# Patient Record
Sex: MALE | Race: WHITE | NOT HISPANIC OR LATINO | URBAN - METROPOLITAN AREA
[De-identification: names, ages, dates, MRNs, and addresses within clinical notes are randomized per-mention and may not be internally consistent; named-entity substitution may affect disease eponyms.]

---

## 2017-02-02 ENCOUNTER — OUTPATIENT (OUTPATIENT)
Dept: OUTPATIENT SERVICES | Facility: HOSPITAL | Age: 82
LOS: 1 days | End: 2017-02-02

## 2017-10-16 ENCOUNTER — OUTPATIENT (OUTPATIENT)
Dept: OUTPATIENT SERVICES | Facility: HOSPITAL | Age: 82
LOS: 1 days | End: 2017-10-16

## 2018-03-01 ENCOUNTER — OUTPATIENT (OUTPATIENT)
Dept: OUTPATIENT SERVICES | Facility: HOSPITAL | Age: 83
LOS: 1 days | End: 2018-03-01

## 2018-07-30 ENCOUNTER — OUTPATIENT (OUTPATIENT)
Dept: OUTPATIENT SERVICES | Facility: HOSPITAL | Age: 83
LOS: 1 days | End: 2018-07-30

## 2018-11-29 ENCOUNTER — OUTPATIENT (OUTPATIENT)
Dept: OUTPATIENT SERVICES | Facility: HOSPITAL | Age: 83
LOS: 1 days | End: 2018-11-29

## 2019-08-08 ENCOUNTER — OUTPATIENT (OUTPATIENT)
Dept: OUTPATIENT SERVICES | Facility: HOSPITAL | Age: 84
LOS: 1 days | End: 2019-08-08

## 2019-11-09 ENCOUNTER — OUTPATIENT (OUTPATIENT)
Dept: OUTPATIENT SERVICES | Facility: HOSPITAL | Age: 84
LOS: 1 days | End: 2019-11-09

## 2020-06-10 ENCOUNTER — OUTPATIENT (OUTPATIENT)
Dept: OUTPATIENT SERVICES | Facility: HOSPITAL | Age: 85
LOS: 1 days | End: 2020-06-10

## 2020-07-28 ENCOUNTER — OUTPATIENT (OUTPATIENT)
Dept: OUTPATIENT SERVICES | Facility: HOSPITAL | Age: 85
LOS: 1 days | End: 2020-07-28

## 2020-07-30 ENCOUNTER — OUTPATIENT (OUTPATIENT)
Dept: OUTPATIENT SERVICES | Facility: HOSPITAL | Age: 85
LOS: 1 days | End: 2020-07-30

## 2020-11-30 ENCOUNTER — OUTPATIENT (OUTPATIENT)
Dept: OUTPATIENT SERVICES | Facility: HOSPITAL | Age: 85
LOS: 1 days | End: 2020-11-30

## 2021-05-14 ENCOUNTER — OUTPATIENT (OUTPATIENT)
Dept: OUTPATIENT SERVICES | Facility: HOSPITAL | Age: 86
LOS: 1 days | End: 2021-05-14

## 2021-06-14 ENCOUNTER — APPOINTMENT (OUTPATIENT)
Dept: ULTRASOUND IMAGING | Facility: CLINIC | Age: 86
End: 2021-06-14
Payer: MEDICARE

## 2021-06-14 PROCEDURE — 93971 EXTREMITY STUDY: CPT | Mod: RT

## 2021-08-20 ENCOUNTER — INPATIENT (INPATIENT)
Facility: HOSPITAL | Age: 86
LOS: 4 days | Discharge: HOME HEALTH | End: 2021-08-25
Payer: MEDICARE

## 2021-08-20 ENCOUNTER — OUTPATIENT (OUTPATIENT)
Dept: OUTPATIENT SERVICES | Facility: HOSPITAL | Age: 86
LOS: 1 days | End: 2021-08-20

## 2021-08-20 PROCEDURE — 93010 ELECTROCARDIOGRAM REPORT: CPT

## 2021-08-20 PROCEDURE — 71045 X-RAY EXAM CHEST 1 VIEW: CPT | Mod: 26

## 2021-08-20 PROCEDURE — 99284 EMERGENCY DEPT VISIT MOD MDM: CPT

## 2021-08-20 PROCEDURE — 99223 1ST HOSP IP/OBS HIGH 75: CPT

## 2021-08-20 PROCEDURE — 93970 EXTREMITY STUDY: CPT | Mod: 26

## 2021-08-21 ENCOUNTER — OUTPATIENT (OUTPATIENT)
Dept: OUTPATIENT SERVICES | Facility: HOSPITAL | Age: 86
LOS: 1 days | End: 2021-08-21

## 2021-08-21 PROCEDURE — 71045 X-RAY EXAM CHEST 1 VIEW: CPT | Mod: 26

## 2021-08-21 PROCEDURE — 93010 ELECTROCARDIOGRAM REPORT: CPT

## 2021-08-21 PROCEDURE — 93306 TTE W/DOPPLER COMPLETE: CPT | Mod: 26

## 2021-08-21 PROCEDURE — 99233 SBSQ HOSP IP/OBS HIGH 50: CPT

## 2021-08-22 ENCOUNTER — OUTPATIENT (OUTPATIENT)
Dept: OUTPATIENT SERVICES | Facility: HOSPITAL | Age: 86
LOS: 1 days | End: 2021-08-22

## 2021-08-23 PROCEDURE — 99233 SBSQ HOSP IP/OBS HIGH 50: CPT | Mod: 25

## 2021-08-23 PROCEDURE — 93460 R&L HRT ART/VENTRICLE ANGIO: CPT | Mod: 26

## 2021-08-24 ENCOUNTER — OUTPATIENT (OUTPATIENT)
Dept: OUTPATIENT SERVICES | Facility: HOSPITAL | Age: 86
LOS: 1 days | End: 2021-08-24

## 2021-08-24 PROCEDURE — 99233 SBSQ HOSP IP/OBS HIGH 50: CPT

## 2021-08-25 ENCOUNTER — OUTPATIENT (OUTPATIENT)
Dept: OUTPATIENT SERVICES | Facility: HOSPITAL | Age: 86
LOS: 1 days | End: 2021-08-25

## 2021-08-27 ENCOUNTER — APPOINTMENT (OUTPATIENT)
Dept: CARDIOLOGY | Facility: CLINIC | Age: 86
End: 2021-08-27
Payer: MEDICARE

## 2021-08-27 VITALS
BODY MASS INDEX: 32.74 KG/M2 | TEMPERATURE: 97.6 F | SYSTOLIC BLOOD PRESSURE: 94 MMHG | HEART RATE: 100 BPM | OXYGEN SATURATION: 95 % | HEIGHT: 68 IN | DIASTOLIC BLOOD PRESSURE: 48 MMHG | WEIGHT: 216 LBS | RESPIRATION RATE: 13 BRPM

## 2021-08-27 DIAGNOSIS — M10.9 GOUT, UNSPECIFIED: ICD-10-CM

## 2021-08-27 DIAGNOSIS — Z78.9 OTHER SPECIFIED HEALTH STATUS: ICD-10-CM

## 2021-08-27 DIAGNOSIS — H91.90 UNSPECIFIED HEARING LOSS, UNSPECIFIED EAR: ICD-10-CM

## 2021-08-27 PROCEDURE — 99215 OFFICE O/P EST HI 40 MIN: CPT

## 2021-08-27 RX ORDER — METOPROLOL TARTRATE 25 MG/1
25 TABLET, FILM COATED ORAL DAILY
Refills: 0 | Status: DISCONTINUED | COMMUNITY
End: 2021-08-27

## 2021-08-27 NOTE — DISCUSSION/SUMMARY
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 feels well after diuresis inpatient. tolerating dapt in anticipation of pci. \par \par \par PLAN:\par - continue dapt in anticipation of PCI at [facility] on 9/9/21\par - continue current BP regimen and GDMT with coreg 6.25 BID and losartan 25; in future can consider entresto however BP precludes this at this time\par - continue high potency statin atorva 40; will increase 80 after pci\par - will repeat TTE 3 months after revasc for LVEF and potential need for ICD evaluation\par \par \par Follow up after PCI at [facility]. ER precautions given to patient.\par

## 2021-08-27 NOTE — PHYSICAL EXAM
[Well Developed] : well developed [Well Nourished] : well nourished [No Acute Distress] : no acute distress [Normal Conjunctiva] : normal conjunctiva [Normal Venous Pressure] : normal venous pressure [Normal S1, S2] : normal S1, S2 [No Rub] : no rub [Clear Lung Fields] : clear lung fields [Good Air Entry] : good air entry [No Respiratory Distress] : no respiratory distress  [Soft] : abdomen soft [Non Tender] : non-tender [Normal Bowel Sounds] : normal bowel sounds [No Cyanosis] : no cyanosis [No Clubbing] : no clubbing [No Rash] : no rash [No Skin Lesions] : no skin lesions [Moves all extremities] : moves all extremities [No Focal Deficits] : no focal deficits [Normal Speech] : normal speech [Alert and Oriented] : alert and oriented [Normal memory] : normal memory [de-identified] : walker, arthritic gait [de-identified] : 1+ edema

## 2021-08-27 NOTE — HISTORY OF PRESENT ILLNESS
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 tolerating dapt in anticipation of pci. Patient denies chest pain, shortness of breath, orthopnea, PND, LE edema, syncope, near syncope.\par \par rra Access site without significant pain/tenderness, redness, swelling, + mild bruising, full ROM, no bruit upon auscultation, and patent distal pulses.\par \par \par TESTING:\par cath 8/23/21: mid lad 1,1,0 across D2 and mid RCA stenosis. mild elevated r/l pressures preserved index\par labwork: cr 1.2, hgb 12.7, \par Echo 8/2021: Ef 30% inferior rwma. mild LAE, mod AS, mod AR, mod MR, mod TR. PASP 55 prior to diuresis

## 2021-08-31 ENCOUNTER — NON-APPOINTMENT (OUTPATIENT)
Age: 86
End: 2021-08-31

## 2021-09-06 ENCOUNTER — APPOINTMENT (OUTPATIENT)
Dept: DISASTER EMERGENCY | Facility: CLINIC | Age: 86
End: 2021-09-06

## 2021-09-07 ENCOUNTER — APPOINTMENT (OUTPATIENT)
Dept: CARDIOLOGY | Facility: CLINIC | Age: 86
End: 2021-09-07
Payer: MEDICARE

## 2021-09-07 ENCOUNTER — OUTPATIENT (OUTPATIENT)
Dept: OUTPATIENT SERVICES | Facility: HOSPITAL | Age: 86
LOS: 1 days | End: 2021-09-07

## 2021-09-07 VITALS
HEIGHT: 68 IN | HEART RATE: 74 BPM | OXYGEN SATURATION: 98 % | WEIGHT: 220 LBS | BODY MASS INDEX: 33.34 KG/M2 | TEMPERATURE: 96.8 F | SYSTOLIC BLOOD PRESSURE: 108 MMHG | DIASTOLIC BLOOD PRESSURE: 70 MMHG

## 2021-09-07 DIAGNOSIS — R05 COUGH: ICD-10-CM

## 2021-09-07 LAB — SARS-COV-2 N GENE NPH QL NAA+PROBE: NOT DETECTED

## 2021-09-07 PROCEDURE — 99213 OFFICE O/P EST LOW 20 MIN: CPT

## 2021-09-08 NOTE — REASON FOR VISIT
[FreeTextEntry1] : Florian is a very pleasant 89-year-old male seen today with his wife.  He was asked to come into the office by his cardiologist, Dr. Cantu.  Patient was seen by primary care doctor with concern for cough.  He has a history of ischemic cardiomyopathy and is scheduled for multivessel intervention this coming Thursday at [facility].  Concern was for worsening CHF/pneumonia.\par \par Patient states that this cough is chronic.  There is expectoration, no hemoptysis.  There is slight increase over the last several weeks to months.  Occurs mostly at night when lying in bed.  Denies shortness of breath, PND, or orthopnea.  There is chronic and improving edema.  Denies any febrile illness.  Denies any other symptoms.

## 2021-09-08 NOTE — ASSESSMENT
[FreeTextEntry1] : To review, Florian is a very pleasant 89-year-old male that presents for evaluation of cough.\par \par CAD/ischemic cardiomyopathy: Currently asymptomatic.  Patient scheduled to undergo multivessel intervention this coming Thursday.  No change at this time.\par \par Cough: Chronic.  Continue follow-up with PMD.  No change at this time.  No signs or symptoms of heart failure.\par \par Case discussed with patient's cardiologist, Dr. Cantu via telephone.

## 2021-09-08 NOTE — REVIEW OF SYSTEMS
[Cough] : cough [Wheezing] : wheezing [Negative] : Constitutional [Coughing Up Blood] : no hemoptysis

## 2021-09-08 NOTE — PHYSICAL EXAM
[Normal] : normal S1, S2, no murmur, no rub, no gallop [Good Air Entry] : good air entry [No Respiratory Distress] : no respiratory distress  [de-identified] : Inspriatory and expiratory wheezing b/l no rales or rhonchi.

## 2021-09-09 ENCOUNTER — TRANSCRIPTION ENCOUNTER (OUTPATIENT)
Age: 86
End: 2021-09-09

## 2021-09-09 ENCOUNTER — OUTPATIENT (OUTPATIENT)
Dept: OUTPATIENT SERVICES | Facility: HOSPITAL | Age: 86
LOS: 1 days | End: 2021-09-09
Payer: MEDICARE

## 2021-09-10 ENCOUNTER — TRANSCRIPTION ENCOUNTER (OUTPATIENT)
Age: 86
End: 2021-09-10

## 2021-09-10 PROCEDURE — 85025 COMPLETE CBC W/AUTO DIFF WBC: CPT

## 2021-09-10 PROCEDURE — 86900 BLOOD TYPING SEROLOGIC ABO: CPT

## 2021-09-10 PROCEDURE — 86769 SARS-COV-2 COVID-19 ANTIBODY: CPT

## 2021-09-10 PROCEDURE — 36415 COLL VENOUS BLD VENIPUNCTURE: CPT

## 2021-09-10 PROCEDURE — 93458 L HRT ARTERY/VENTRICLE ANGIO: CPT | Mod: XU

## 2021-09-10 PROCEDURE — C1887: CPT

## 2021-09-10 PROCEDURE — 80048 BASIC METABOLIC PNL TOTAL CA: CPT

## 2021-09-10 PROCEDURE — C9600: CPT | Mod: XU

## 2021-09-10 PROCEDURE — C1725: CPT

## 2021-09-10 PROCEDURE — C1874: CPT

## 2021-09-10 PROCEDURE — C1753: CPT

## 2021-09-10 PROCEDURE — C1894: CPT

## 2021-09-10 PROCEDURE — 93005 ELECTROCARDIOGRAM TRACING: CPT

## 2021-09-10 PROCEDURE — 92978 ENDOLUMINL IVUS OCT C 1ST: CPT | Mod: LD

## 2021-09-10 PROCEDURE — 86901 BLOOD TYPING SEROLOGIC RH(D): CPT

## 2021-09-10 PROCEDURE — C1769: CPT

## 2021-09-10 PROCEDURE — 86850 RBC ANTIBODY SCREEN: CPT

## 2021-09-10 PROCEDURE — 80076 HEPATIC FUNCTION PANEL: CPT

## 2021-09-10 PROCEDURE — 99153 MOD SED SAME PHYS/QHP EA: CPT

## 2021-09-10 PROCEDURE — 99152 MOD SED SAME PHYS/QHP 5/>YRS: CPT

## 2021-09-13 DIAGNOSIS — I25.10 ATHEROSCLEROTIC HEART DISEASE OF NATIVE CORONARY ARTERY WITHOUT ANGINA PECTORIS: ICD-10-CM

## 2021-09-14 ENCOUNTER — OUTPATIENT (OUTPATIENT)
Dept: OUTPATIENT SERVICES | Facility: HOSPITAL | Age: 86
LOS: 1 days | End: 2021-09-14

## 2021-09-14 DIAGNOSIS — Z90.49 ACQUIRED ABSENCE OF OTHER SPECIFIED PARTS OF DIGESTIVE TRACT: Chronic | ICD-10-CM

## 2021-09-14 PROBLEM — E78.5 HYPERLIPIDEMIA, UNSPECIFIED: Chronic | Status: ACTIVE | Noted: 2021-09-08

## 2021-09-14 PROBLEM — I25.5 ISCHEMIC CARDIOMYOPATHY: Chronic | Status: ACTIVE | Noted: 2021-09-08

## 2021-09-14 PROBLEM — I50.22 CHRONIC SYSTOLIC (CONGESTIVE) HEART FAILURE: Chronic | Status: ACTIVE | Noted: 2021-09-08

## 2021-09-15 ENCOUNTER — APPOINTMENT (OUTPATIENT)
Dept: CARDIOLOGY | Facility: CLINIC | Age: 86
End: 2021-09-15
Payer: MEDICARE

## 2021-09-15 ENCOUNTER — NON-APPOINTMENT (OUTPATIENT)
Age: 86
End: 2021-09-15

## 2021-09-15 VITALS
OXYGEN SATURATION: 97 % | HEART RATE: 75 BPM | DIASTOLIC BLOOD PRESSURE: 50 MMHG | HEIGHT: 68 IN | WEIGHT: 218 LBS | BODY MASS INDEX: 33.04 KG/M2 | SYSTOLIC BLOOD PRESSURE: 100 MMHG | TEMPERATURE: 98.4 F

## 2021-09-15 PROCEDURE — 93000 ELECTROCARDIOGRAM COMPLETE: CPT

## 2021-09-15 PROCEDURE — 99215 OFFICE O/P EST HI 40 MIN: CPT

## 2021-09-15 NOTE — HISTORY OF PRESENT ILLNESS
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  R CFA manual hemostasis without issue.  He feels great with increased energy.  Confirmed with wife.  Compliant with dual antiplatelet therapy.  On ARB and beta-blocker max dose.\par \par Repeat lab work showed stable creatinine at 1.2 post procedure.  LDL 29.  Hemoglobin stable 12.5.\par \par \par \par TESTING:\par EKG 9/15/2021 left bundle with PVCs.  .\par Cath [place] 9/9/2021: PCI mid LAD.  Residual mid RCA.  EDP 22.\par cath 8/23/21: mid lad 1,1,0 across D2 and mid RCA stenosis. mild elevated r/l pressures preserved index\par labwork: cr 1.2, hgb 12.7, \par Echo 8/2021: Ef 30% inferior rwma. mild LAE, mod AS, mod AR, mod MR, mod TR. PASP 55 prior to diuresis

## 2021-09-15 NOTE — PHYSICAL EXAM
[Well Developed] : well developed [Well Nourished] : well nourished [No Acute Distress] : no acute distress [Normal Conjunctiva] : normal conjunctiva [Normal Venous Pressure] : normal venous pressure [Normal S1, S2] : normal S1, S2 [No Rub] : no rub [Clear Lung Fields] : clear lung fields [Good Air Entry] : good air entry [No Respiratory Distress] : no respiratory distress  [Soft] : abdomen soft [Non Tender] : non-tender [Normal Bowel Sounds] : normal bowel sounds [No Cyanosis] : no cyanosis [No Clubbing] : no clubbing [No Rash] : no rash [No Skin Lesions] : no skin lesions [Moves all extremities] : moves all extremities [No Focal Deficits] : no focal deficits [Normal Speech] : normal speech [Alert and Oriented] : alert and oriented [Normal memory] : normal memory [de-identified] : walker, arthritic gait [de-identified] : 1+ edema

## 2021-09-15 NOTE — REASON FOR VISIT
[Symptom and Test Evaluation] : symptom and test evaluation [Coronary Artery Disease] : coronary artery disease [Cardiac Failure] : cardiac failure [CV Risk Factors and Non-Cardiac Disease] : CV risk factors and non-cardiac disease

## 2021-09-15 NOTE — DISCUSSION/SUMMARY
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  Plan for staged PCI mid RCA for complete revascularization on 10/14/2021 at [place].  After this will order for cardiac rehab and repeat TTE 3 months after.\par - continue current BP regimen and GDMT with coreg 6.25 BID and losartan 25; in future can consider entresto however BP precludes this at this time\par -  increase Lasix to 40 daily. low Na diet.\par - continue high potency statin atorva 40; will increase 80 after staged pci rca\par - will repeat TTE 3 months after revasc for LVEF and potential need for ICD evaluation with EP consultation\par - sent all refills in today\par \par \par Follow up after PCI at [facility]. ER precautions given to patient.\par

## 2021-09-21 ENCOUNTER — NON-APPOINTMENT (OUTPATIENT)
Age: 86
End: 2021-09-21

## 2021-10-11 ENCOUNTER — APPOINTMENT (OUTPATIENT)
Dept: DISASTER EMERGENCY | Facility: CLINIC | Age: 86
End: 2021-10-11

## 2021-10-11 DIAGNOSIS — Z01.818 ENCOUNTER FOR OTHER PREPROCEDURAL EXAMINATION: ICD-10-CM

## 2021-10-12 ENCOUNTER — OUTPATIENT (OUTPATIENT)
Dept: OUTPATIENT SERVICES | Facility: HOSPITAL | Age: 86
LOS: 1 days | End: 2021-10-12

## 2021-10-12 DIAGNOSIS — Z90.49 ACQUIRED ABSENCE OF OTHER SPECIFIED PARTS OF DIGESTIVE TRACT: Chronic | ICD-10-CM

## 2021-10-12 LAB — SARS-COV-2 N GENE NPH QL NAA+PROBE: NOT DETECTED

## 2021-10-14 ENCOUNTER — OUTPATIENT (OUTPATIENT)
Dept: OUTPATIENT SERVICES | Facility: HOSPITAL | Age: 86
LOS: 1 days | End: 2021-10-14
Payer: MEDICARE

## 2021-10-14 ENCOUNTER — TRANSCRIPTION ENCOUNTER (OUTPATIENT)
Age: 86
End: 2021-10-14

## 2021-10-14 DIAGNOSIS — Z90.49 ACQUIRED ABSENCE OF OTHER SPECIFIED PARTS OF DIGESTIVE TRACT: Chronic | ICD-10-CM

## 2021-10-15 ENCOUNTER — TRANSCRIPTION ENCOUNTER (OUTPATIENT)
Age: 86
End: 2021-10-15

## 2021-10-15 PROCEDURE — 83735 ASSAY OF MAGNESIUM: CPT

## 2021-10-15 PROCEDURE — 80048 BASIC METABOLIC PNL TOTAL CA: CPT

## 2021-10-15 PROCEDURE — 85025 COMPLETE CBC W/AUTO DIFF WBC: CPT

## 2021-10-15 PROCEDURE — 80053 COMPREHEN METABOLIC PANEL: CPT

## 2021-10-15 PROCEDURE — C1725: CPT

## 2021-10-15 PROCEDURE — C1894: CPT

## 2021-10-15 PROCEDURE — 93005 ELECTROCARDIOGRAM TRACING: CPT

## 2021-10-15 PROCEDURE — C9600: CPT | Mod: RC

## 2021-10-15 PROCEDURE — 99152 MOD SED SAME PHYS/QHP 5/>YRS: CPT

## 2021-10-15 PROCEDURE — 36415 COLL VENOUS BLD VENIPUNCTURE: CPT

## 2021-10-15 PROCEDURE — 99153 MOD SED SAME PHYS/QHP EA: CPT

## 2021-10-15 PROCEDURE — 85027 COMPLETE CBC AUTOMATED: CPT

## 2021-10-15 PROCEDURE — 93458 L HRT ARTERY/VENTRICLE ANGIO: CPT | Mod: 59

## 2021-10-15 PROCEDURE — 80061 LIPID PANEL: CPT

## 2021-10-15 PROCEDURE — C1887: CPT

## 2021-10-15 PROCEDURE — C1769: CPT

## 2021-10-15 PROCEDURE — C1874: CPT

## 2021-10-15 PROCEDURE — 85576 BLOOD PLATELET AGGREGATION: CPT

## 2021-10-18 ENCOUNTER — NON-APPOINTMENT (OUTPATIENT)
Age: 86
End: 2021-10-18

## 2021-10-18 ENCOUNTER — APPOINTMENT (OUTPATIENT)
Dept: CARDIOLOGY | Facility: CLINIC | Age: 86
End: 2021-10-18
Payer: MEDICARE

## 2021-10-18 VITALS
HEART RATE: 80 BPM | TEMPERATURE: 97.6 F | OXYGEN SATURATION: 95 % | SYSTOLIC BLOOD PRESSURE: 110 MMHG | WEIGHT: 216 LBS | BODY MASS INDEX: 32.74 KG/M2 | HEIGHT: 68 IN | DIASTOLIC BLOOD PRESSURE: 68 MMHG

## 2021-10-18 PROBLEM — I10 ESSENTIAL (PRIMARY) HYPERTENSION: Chronic | Status: ACTIVE | Noted: 2021-09-08

## 2021-10-18 PROCEDURE — 93000 ELECTROCARDIOGRAM COMPLETE: CPT

## 2021-10-18 PROCEDURE — 99215 OFFICE O/P EST HI 40 MIN: CPT

## 2021-10-18 RX ORDER — COLCHICINE 0.6 MG/1
0.6 TABLET ORAL DAILY
Refills: 0 | Status: DISCONTINUED | COMMUNITY
End: 2021-10-18

## 2021-10-18 NOTE — REASON FOR VISIT
[Symptom and Test Evaluation] : symptom and test evaluation [Cardiac Failure] : cardiac failure [CV Risk Factors and Non-Cardiac Disease] : CV risk factors and non-cardiac disease [Coronary Artery Disease] : coronary artery disease

## 2021-10-19 DIAGNOSIS — I25.10 ATHEROSCLEROTIC HEART DISEASE OF NATIVE CORONARY ARTERY WITHOUT ANGINA PECTORIS: ICD-10-CM

## 2021-10-19 NOTE — PHYSICAL EXAM
[Well Developed] : well developed [Well Nourished] : well nourished [No Acute Distress] : no acute distress [Normal Conjunctiva] : normal conjunctiva [Normal Venous Pressure] : normal venous pressure [Normal S1, S2] : normal S1, S2 [No Rub] : no rub [Clear Lung Fields] : clear lung fields [Good Air Entry] : good air entry [No Respiratory Distress] : no respiratory distress  [Soft] : abdomen soft [Non Tender] : non-tender [Normal Bowel Sounds] : normal bowel sounds [No Cyanosis] : no cyanosis [No Clubbing] : no clubbing [No Rash] : no rash [No Skin Lesions] : no skin lesions [Moves all extremities] : moves all extremities [No Focal Deficits] : no focal deficits [Normal Speech] : normal speech [Alert and Oriented] : alert and oriented [Normal memory] : normal memory [de-identified] : walker, arthritic gait [de-identified] : 1+ edema

## 2021-10-19 NOTE — DISCUSSION/SUMMARY
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 Improving functional status. Optimizing GDMT.\par \par \par PLAN:\par - GDMT with coreg 6.25 BID and losartan 25; obtaining BMP this week and if ok will switch to entresto low dose and repeat 1 week after that\par - continue dapt. ordered cardiac rehab and a repeat TTE 2 months\par - Lasix 40 daily. low Na diet.\par - increase atorva to 80\par - will repeat TTE in 2 months for LVEF and potential need for ICD evaluation with EP consultation\par - sent all refills in\par \par \par Follow up in 2 months with lumason TTE. ER precautions given to patient.\par

## 2021-10-20 ENCOUNTER — OUTPATIENT (OUTPATIENT)
Dept: OUTPATIENT SERVICES | Facility: HOSPITAL | Age: 86
LOS: 1 days | End: 2021-10-20

## 2021-10-20 DIAGNOSIS — Z90.49 ACQUIRED ABSENCE OF OTHER SPECIFIED PARTS OF DIGESTIVE TRACT: Chronic | ICD-10-CM

## 2021-10-22 ENCOUNTER — NON-APPOINTMENT (OUTPATIENT)
Age: 86
End: 2021-10-22

## 2021-10-22 RX ORDER — LOSARTAN POTASSIUM 25 MG/1
25 TABLET, FILM COATED ORAL
Qty: 90 | Refills: 3 | Status: DISCONTINUED | COMMUNITY
Start: 1900-01-01 | End: 2021-10-22

## 2021-10-26 ENCOUNTER — NON-APPOINTMENT (OUTPATIENT)
Age: 86
End: 2021-10-26

## 2021-10-28 ENCOUNTER — OUTPATIENT (OUTPATIENT)
Dept: OUTPATIENT SERVICES | Facility: HOSPITAL | Age: 86
LOS: 1 days | End: 2021-10-28

## 2021-10-28 ENCOUNTER — NON-APPOINTMENT (OUTPATIENT)
Age: 86
End: 2021-10-28

## 2021-10-28 DIAGNOSIS — Z90.49 ACQUIRED ABSENCE OF OTHER SPECIFIED PARTS OF DIGESTIVE TRACT: Chronic | ICD-10-CM

## 2021-10-29 ENCOUNTER — NON-APPOINTMENT (OUTPATIENT)
Age: 86
End: 2021-10-29

## 2021-11-06 ENCOUNTER — EMERGENCY (EMERGENCY)
Facility: HOSPITAL | Age: 86
LOS: 1 days | End: 2021-11-06
Admitting: EMERGENCY MEDICINE
Payer: MEDICARE

## 2021-11-06 DIAGNOSIS — Z90.49 ACQUIRED ABSENCE OF OTHER SPECIFIED PARTS OF DIGESTIVE TRACT: Chronic | ICD-10-CM

## 2021-11-06 PROCEDURE — 72125 CT NECK SPINE W/O DYE: CPT | Mod: 26

## 2021-11-06 PROCEDURE — 99284 EMERGENCY DEPT VISIT MOD MDM: CPT | Mod: GC

## 2021-11-06 PROCEDURE — 70486 CT MAXILLOFACIAL W/O DYE: CPT | Mod: 26

## 2021-11-06 PROCEDURE — 70450 CT HEAD/BRAIN W/O DYE: CPT | Mod: 26

## 2021-11-08 ENCOUNTER — OUTPATIENT (OUTPATIENT)
Dept: OUTPATIENT SERVICES | Facility: HOSPITAL | Age: 86
LOS: 1 days | End: 2021-11-08

## 2021-11-08 DIAGNOSIS — Z90.49 ACQUIRED ABSENCE OF OTHER SPECIFIED PARTS OF DIGESTIVE TRACT: Chronic | ICD-10-CM

## 2021-11-10 ENCOUNTER — NON-APPOINTMENT (OUTPATIENT)
Age: 86
End: 2021-11-10

## 2021-11-17 ENCOUNTER — NON-APPOINTMENT (OUTPATIENT)
Age: 86
End: 2021-11-17

## 2021-12-05 ENCOUNTER — NON-APPOINTMENT (OUTPATIENT)
Age: 86
End: 2021-12-05

## 2022-01-03 ENCOUNTER — APPOINTMENT (OUTPATIENT)
Dept: CARDIOLOGY | Facility: CLINIC | Age: 87
End: 2022-01-03
Payer: MEDICARE

## 2022-01-03 ENCOUNTER — NON-APPOINTMENT (OUTPATIENT)
Age: 87
End: 2022-01-03

## 2022-01-03 VITALS
WEIGHT: 232 LBS | BODY MASS INDEX: 34.36 KG/M2 | HEART RATE: 76 BPM | HEIGHT: 69 IN | TEMPERATURE: 97.3 F | OXYGEN SATURATION: 94 % | SYSTOLIC BLOOD PRESSURE: 112 MMHG | DIASTOLIC BLOOD PRESSURE: 64 MMHG

## 2022-01-03 PROCEDURE — 96374 THER/PROPH/DIAG INJ IV PUSH: CPT | Mod: 59

## 2022-01-03 PROCEDURE — 93306 TTE W/DOPPLER COMPLETE: CPT

## 2022-01-03 PROCEDURE — 99215 OFFICE O/P EST HI 40 MIN: CPT

## 2022-01-03 RX ORDER — FUROSEMIDE 40 MG/1
40 TABLET ORAL DAILY
Qty: 90 | Refills: 3 | Status: DISCONTINUED | COMMUNITY
Start: 1900-01-01 | End: 2022-01-03

## 2022-01-03 NOTE — HISTORY OF PRESENT ILLNESS
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  PASP 68.  Severe left atrial enlargement.  Mild MR, mild mild AS, moderate AI.  Normal RV function.\par \par increased weight, edema likely due to sob therapeutic diuretic. was doing great in cardiac rehab but stopping this month because of concern for covid. Denies chest pain/SOB. Improving functional status. \par \par Compliant with dual antiplatelet therapy.  On entresto and beta-blocker max doses.\par \par \par TESTING:\par 1/2022 Lumason TTE: EF 35 to 40%.  PASP 68.  Severe left atrial enlargement.  Mild MR, mild mild AS, moderate AI.  Normal RV function.\par Labs 11/2021 Cr 1.2. \par Cath [facility] 10/2021: PCI mid RCA. edp 24\par Labs 10/2021: Hgb 10.5. Cr 1.38. \par EKG 9/15/2021 left bundle with PVCs.  .\par Cath [place] 9/9/2021: PCI mid LAD.  Residual mid RCA.  EDP 22.\par cath 8/23/21: mid lad 1,1,0 across D2 and mid RCA stenosis. mild elevated r/l pressures preserved index\par labwork: cr 1.2, hgb 12.7, LDL 29. \par Echo 8/2021: Ef 30% inferior rwma. mild LAE, mod AS, mod AR, mod MR, mod TR. PASP 55 prior to diuresis

## 2022-01-03 NOTE — DISCUSSION/SUMMARY
[FreeTextEntry1] : \par 89 yo M with hx of HTN and ICMP anastasiya 30% now 35-40% (PCI of mLAD and staged mid RCA 10/2021), LBBB>150 msec, moderate valve disease Aortic/MR, HLD, severe pulm HTN, CKD III (1.2).\par \par increased weight, edema likely due to sob therapeutic diuretic. was doing great in cardiac rehab but stopping this month because of concern for covid. Denies chest pain/SOB. Improving functional status.  Compliant with dual antiplatelet therapy.  On entresto and beta-blocker max doses.\par \par \par PLAN:\par - switch lasix to bumex 1 mg, labwork next week. fluid overload presently. call in 1 week and reassess.\par - GDMT optimized. biv ICD evaluation with EP consultation\par - continue dapt\par - low Na diet.\par - atorva 80\par - yearly TTE next 12/2022 or clinical status change.\par \par \par Follow up in 1 month same day as EP evaluation. ER precautions given to patient.\par

## 2022-01-03 NOTE — PHYSICAL EXAM
[Well Developed] : well developed [Well Nourished] : well nourished [No Acute Distress] : no acute distress [Normal Conjunctiva] : normal conjunctiva [Normal Venous Pressure] : normal venous pressure [Normal S1, S2] : normal S1, S2 [No Rub] : no rub [Soft] : abdomen soft [Non Tender] : non-tender [Normal Bowel Sounds] : normal bowel sounds [No Cyanosis] : no cyanosis [No Clubbing] : no clubbing [No Rash] : no rash [No Skin Lesions] : no skin lesions [Moves all extremities] : moves all extremities [No Focal Deficits] : no focal deficits [Normal Speech] : normal speech [Alert and Oriented] : alert and oriented [Normal memory] : normal memory [No Respiratory Distress] : no respiratory distress  [de-identified] : Expiratory wheezing diffusely. [de-identified] : walker, arthritic gait [de-identified] : 1-2+ edema

## 2022-01-03 NOTE — REASON FOR VISIT
[Symptom and Test Evaluation] : symptom and test evaluation [Cardiac Failure] : cardiac failure [CV Risk Factors and Non-Cardiac Disease] : CV risk factors and non-cardiac disease [Coronary Artery Disease] : coronary artery disease [Spouse] : spouse

## 2022-02-07 ENCOUNTER — APPOINTMENT (OUTPATIENT)
Dept: CARDIOLOGY | Facility: CLINIC | Age: 87
End: 2022-02-07
Payer: MEDICARE

## 2022-02-07 ENCOUNTER — APPOINTMENT (OUTPATIENT)
Dept: ELECTROPHYSIOLOGY | Facility: CLINIC | Age: 87
End: 2022-02-07
Payer: MEDICARE

## 2022-02-07 ENCOUNTER — NON-APPOINTMENT (OUTPATIENT)
Age: 87
End: 2022-02-07

## 2022-02-07 VITALS
DIASTOLIC BLOOD PRESSURE: 68 MMHG | SYSTOLIC BLOOD PRESSURE: 130 MMHG | HEART RATE: 77 BPM | WEIGHT: 239 LBS | TEMPERATURE: 97.1 F | OXYGEN SATURATION: 95 % | BODY MASS INDEX: 36.22 KG/M2 | HEIGHT: 68 IN | RESPIRATION RATE: 18 BRPM

## 2022-02-07 PROCEDURE — 99204 OFFICE O/P NEW MOD 45 MIN: CPT

## 2022-02-07 PROCEDURE — 93000 ELECTROCARDIOGRAM COMPLETE: CPT

## 2022-02-07 PROCEDURE — 99215 OFFICE O/P EST HI 40 MIN: CPT

## 2022-02-07 RX ORDER — BACITRACIN ZINC AND POLYMYXIN B SULFATE 500; 10000 [USP'U]/G; [USP'U]/G
500-10000 OINTMENT OPHTHALMIC
Qty: 4 | Refills: 0 | Status: COMPLETED | COMMUNITY
Start: 2021-11-10

## 2022-02-07 NOTE — PHYSICAL EXAM
[No Acute Distress] : no acute distress [Normal Conjunctiva] : normal conjunctiva [Normal Venous Pressure] : normal venous pressure [Normal S1, S2] : normal S1, S2 [Good Air Entry] : good air entry [Non Tender] : non-tender [No Edema] : no edema [No Focal Deficits] : no focal deficits

## 2022-02-07 NOTE — HISTORY OF PRESENT ILLNESS
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 he has increased urine output on bumex. frequent and high volume, but weight increasing. willing to restart cardiac rehab in march. \par Compliant with dual antiplatelet therapy.  On entresto and beta-blocker max doses.\par \par 1/3/22 "Lumason TTE with: EF 35 to 40%.  PASP 68.  Severe left atrial enlargement.  Mild MR, mild mild AS, moderate AI.  Normal RV function."\par \par \par TESTING:\par 1/2022 Lumason TTE: EF 35 to 40%.  PASP 68.  Severe left atrial enlargement.  Mild MR, mild mild AS, moderate AI.  Normal RV function.\par Labs 11/2021 Cr 1.2. \par Cath [facility] 10/2021: PCI mid RCA. edp 24\par Labs 10/2021: Hgb 10.5. Cr 1.38. \par EKG 9/15/2021 left bundle with PVCs.  .\par Cath [place] 9/9/2021: PCI mid LAD.  Residual mid RCA.  EDP 22.\par cath 8/23/21: mid lad 1,1,0 across D2 and mid RCA stenosis. mild elevated r/l pressures preserved index\par labwork: cr 1.2, hgb 12.7, LDL 29. \par Echo 8/2021: Ef 30% inferior rwma. mild LAE, mod AS, mod AR, mod MR, mod TR. PASP 55 prior to diuresis

## 2022-02-07 NOTE — DISCUSSION/SUMMARY
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 he has increased urine output on bumex. frequent and high volume, but weight increasing. Compliant with dual antiplatelet therapy.  On entresto and beta-blocker max doses.\par \par \par PLAN:\par - continue bumex 1, pending labwork today. I will call.\par - GDMT optimized. biv ICD evaluation with EP consultation today\par - continue dapt\par - low Na diet.\par - atorva 80\par - restart cardiac rehab in march\par - yearly TTE next 12/2022 or clinical status change.\par \par \par Follow up in 4 months. ER precautions given to patient.\par

## 2022-02-07 NOTE — PHYSICAL EXAM
[Well Developed] : well developed [Well Nourished] : well nourished [No Acute Distress] : no acute distress [Normal Conjunctiva] : normal conjunctiva [Normal Venous Pressure] : normal venous pressure [Normal S1, S2] : normal S1, S2 [No Rub] : no rub [No Respiratory Distress] : no respiratory distress  [Soft] : abdomen soft [Non Tender] : non-tender [Normal Bowel Sounds] : normal bowel sounds [No Cyanosis] : no cyanosis [No Clubbing] : no clubbing [No Rash] : no rash [No Skin Lesions] : no skin lesions [Moves all extremities] : moves all extremities [No Focal Deficits] : no focal deficits [Normal Speech] : normal speech [Alert and Oriented] : alert and oriented [Normal memory] : normal memory [de-identified] : Expiratory wheezing improved to now lower bases [de-identified] : walker, arthritic gait [de-identified] : 2+ edema LE

## 2022-02-10 ENCOUNTER — OUTPATIENT (OUTPATIENT)
Dept: OUTPATIENT SERVICES | Facility: HOSPITAL | Age: 87
LOS: 1 days | End: 2022-02-10

## 2022-02-10 DIAGNOSIS — I44.7 LEFT BUNDLE-BRANCH BLOCK, UNSPECIFIED: ICD-10-CM

## 2022-02-10 DIAGNOSIS — I10 ESSENTIAL (PRIMARY) HYPERTENSION: ICD-10-CM

## 2022-02-10 DIAGNOSIS — Z90.49 ACQUIRED ABSENCE OF OTHER SPECIFIED PARTS OF DIGESTIVE TRACT: Chronic | ICD-10-CM

## 2022-02-10 DIAGNOSIS — I25.10 ATHEROSCLEROTIC HEART DISEASE OF NATIVE CORONARY ARTERY WITHOUT ANGINA PECTORIS: ICD-10-CM

## 2022-02-10 DIAGNOSIS — E66.9 OBESITY, UNSPECIFIED: ICD-10-CM

## 2022-02-10 DIAGNOSIS — I25.5 ISCHEMIC CARDIOMYOPATHY: ICD-10-CM

## 2022-02-10 DIAGNOSIS — C18.9 MALIGNANT NEOPLASM OF COLON, UNSPECIFIED: ICD-10-CM

## 2022-02-26 NOTE — HISTORY OF PRESENT ILLNESS
[FreeTextEntry1] : Patient is a 89-year-old man who was accompanied by his spouse.  He is hard of hearing.  He is here for an evaluation of whether he would benefit from a biventricular device.\par \par He denies chest pain, shortness of breath, dizziness, lightens, syncope or presyncope.  His only complaint is numbness in his fingers bilaterally.  Gets better after he puts gloves on.  This is occurred after his stent procedures.\par \par He has a prior history of hypertension and cardiomyopathy with an EF now 35 to 40%.  He has had prior PCI to the mid LAD and staged RCA October 2021.  The patient also has moderate aortic stenosis, moderate mitral regurgitation, severe pulmonary hypertension and CKD stage III.  He has the left bundle branch block with QRS duration 160 ms.\par \par Echocardiogram from 1/3/2022 showed EF 30%, left atrial diameter 4.1 cm, moderate eccentric mitral regurgitation, moderate aortic stenosis, severely dilated left atrium with left atrial volume index 57 cc/m², moderate diastolic dysfunction.

## 2022-02-26 NOTE — DISCUSSION/SUMMARY
[FreeTextEntry1] : Based on his EF > 35% patient would not be a candidate for an ICD.  He does have a left bundle branch block that  is wide.  He does not have heart failure symptoms and because of his age we will  monitor him - should he develop symptoms or worsening -  we would reconsider at least a biventricular pacing device.\par \par I discussed with the patient as well as his wife who feels he is very strong and does want any procedure.\par \par The patient is on carvedilol 6.25 mg twice daily, Entresto and diuretic.  We will continue on medical therapy.

## 2022-02-26 NOTE — REVIEW OF SYSTEMS
[Weight Gain (___ Lbs)] : [unfilled] ~Ulb weight gain [Blurry Vision] : blurred vision [Hearing Loss] : hearing loss [Cough] : cough [SOB] : no shortness of breath [Sore Throat] : no sore throat [Dyspnea on exertion] : not dyspnea during exertion [Chest Discomfort] : no chest discomfort [Palpitations] : no palpitations [Orthopnea] : no orthopnea [Syncope] : no syncope [Abdominal Pain] : no abdominal pain [Hematuria] : no hematuria [Dizziness] : no dizziness [Confusion] : no confusion was observed [Memory Lapses Or Loss] : no memory lapses or loss [Easy Bleeding] : no tendency for easy bleeding

## 2022-03-18 ENCOUNTER — NON-APPOINTMENT (OUTPATIENT)
Age: 87
End: 2022-03-18

## 2022-03-21 ENCOUNTER — APPOINTMENT (OUTPATIENT)
Dept: CARDIOLOGY | Facility: CLINIC | Age: 87
End: 2022-03-21
Payer: MEDICARE

## 2022-03-21 ENCOUNTER — NON-APPOINTMENT (OUTPATIENT)
Age: 87
End: 2022-03-21

## 2022-03-21 ENCOUNTER — OUTPATIENT (OUTPATIENT)
Dept: OUTPATIENT SERVICES | Facility: HOSPITAL | Age: 87
LOS: 1 days | End: 2022-03-21

## 2022-03-21 VITALS
BODY MASS INDEX: 36.37 KG/M2 | WEIGHT: 240 LBS | TEMPERATURE: 97.1 F | SYSTOLIC BLOOD PRESSURE: 108 MMHG | OXYGEN SATURATION: 97 % | DIASTOLIC BLOOD PRESSURE: 70 MMHG | HEIGHT: 68 IN | HEART RATE: 62 BPM

## 2022-03-21 DIAGNOSIS — I38 ENDOCARDITIS, VALVE UNSPECIFIED: ICD-10-CM

## 2022-03-21 DIAGNOSIS — I44.7 LEFT BUNDLE-BRANCH BLOCK, UNSPECIFIED: ICD-10-CM

## 2022-03-21 DIAGNOSIS — I10 ESSENTIAL (PRIMARY) HYPERTENSION: ICD-10-CM

## 2022-03-21 DIAGNOSIS — I25.10 ATHEROSCLEROTIC HEART DISEASE OF NATIVE CORONARY ARTERY WITHOUT ANGINA PECTORIS: ICD-10-CM

## 2022-03-21 DIAGNOSIS — Z90.49 ACQUIRED ABSENCE OF OTHER SPECIFIED PARTS OF DIGESTIVE TRACT: Chronic | ICD-10-CM

## 2022-03-21 DIAGNOSIS — I25.5 ISCHEMIC CARDIOMYOPATHY: ICD-10-CM

## 2022-03-21 PROCEDURE — 93000 ELECTROCARDIOGRAM COMPLETE: CPT

## 2022-03-21 PROCEDURE — 99215 OFFICE O/P EST HI 40 MIN: CPT

## 2022-03-21 RX ORDER — CLOPIDOGREL 75 MG/1
75 TABLET, FILM COATED ORAL DAILY
Qty: 90 | Refills: 2 | Status: DISCONTINUED | COMMUNITY
End: 2022-03-21

## 2022-03-21 RX ORDER — CARVEDILOL 6.25 MG/1
6.25 TABLET, FILM COATED ORAL TWICE DAILY
Qty: 180 | Refills: 3 | Status: DISCONTINUED | COMMUNITY
Start: 1900-01-01 | End: 2022-03-21

## 2022-03-21 NOTE — PHYSICAL EXAM
[Well Developed] : well developed [Well Nourished] : well nourished [No Acute Distress] : no acute distress [Normal Conjunctiva] : normal conjunctiva [Normal S1, S2] : normal S1, S2 [No Rub] : no rub [No Respiratory Distress] : no respiratory distress  [Soft] : abdomen soft [Non Tender] : non-tender [Normal Bowel Sounds] : normal bowel sounds [Moves all extremities] : moves all extremities [Alert and Oriented] : alert and oriented [Normal memory] : normal memory [Obese] : obese [de-identified] : JVPE 16 [de-identified] : mild expiratory wheezing with faint bibasilar crackles [de-identified] : walker, arthritic gait [de-identified] : 3+ edema LE, brawny skin [de-identified] : fungal

## 2022-03-21 NOTE — DISCUSSION/SUMMARY
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  Difficulty sleeping.  Increasing lower extremity edema and weight gain over the past few months.  Interim lab work showed increasing BUN to creatinine ratio.  I added on lab work today to [facility] which I received and shows that his BUN is now 60 and creatinine 1.5.  He does urinate more with the Bumex but this has dropped off a bit.  Denies angina.  EKG today with unchanged left bundle branch block.\par He also was evaluated in the interim by EP and declines procedures at present.\par Has a severe intertriginous fungal rash.\par \par \par PLAN:\par -Add on lab work today at [facility] system shows BUN is 60 and potassium 5.2.  I called after visit and recommend ER presentation to safely diurese patient with close monitoring of CMP.  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 MILLIE. Concerned for severe pHTN and potentially RV failure in addition to LV failure. \par -Patient/family prefer to take Bumex p.o. tonight and then present in a.m. to [facility].  They understand risks of this.\par -For now, he will not take the Entresto/carvedilol given home blood pressures in 90s with significant fatigue.  Will restart slowly after [facility] presentation.\par -Patient has been on optimal medical therapy and saw EP.  Defer on procedures at present.\par -I stopped Plavix.  Continue aspirin for monotherapy now.  LDL very well controlled on atorvastatin 80.\par -Restart cardiac rehab after improvement post hospitalization.\par - elevate LEs. compression stockings after discharge. \par - nystatin\par \par \par Follow-up after ER presentation.\par

## 2022-03-21 NOTE — REASON FOR VISIT
[Symptom and Test Evaluation] : symptom and test evaluation [Cardiac Failure] : cardiac failure [CV Risk Factors and Non-Cardiac Disease] : CV risk factors and non-cardiac disease [Coronary Artery Disease] : coronary artery disease [Spouse] : spouse [Family Member] : family member

## 2022-03-21 NOTE — HISTORY OF PRESENT ILLNESS
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  Difficulty sleeping.  Increasing lower extremity edema and weight gain over the past few months.  Interim lab work showed increasing BUN to creatinine ratio.  I added on lab work today to [facility] which I received and shows that his BUN is now 60 and creatinine 1.5.  He does urinate more with the Bumex but this has dropped off a bit.  Denies angina.  EKG today with unchanged left bundle branch block.\par He also was evaluated in the interim by EP and declines procedures at present.\par Has a severe intertriginous fungal rash.\par \par \par TESTING:\par 3/2022 LABWORK [facility]: BUN 60. Cr 1.5. LFT mild elevated. LDL 19. normal ferritin. Hgb 11.8. \par 1/2022 [facility] TTE: EF 35 to 40%.  PASP 68.  Severe left atrial enlargement.  Mild MR, mild mild AS, moderate AI.  Normal RV function.\par Labs 11/2021 Cr 1.2. \par Cath [facility] 10/2021: PCI mid RCA. edp 24\par Labs 10/2021: Hgb 10.5. Cr 1.38. \par EKG 9/15/2021 left bundle with PVCs.  .\par Cath [place] 9/9/2021: PCI mid LAD.  Residual mid RCA.  EDP 22.\par cath 8/23/21: mid lad 1,1,0 across D2 and mid RCA stenosis. mild elevated r/l pressures preserved index\par labwork: cr 1.2, hgb 12.7, LDL 29. \par Echo 8/2021: Ef 30% inferior rwma. mild LAE, mod AS, mod AR, mod MR, mod TR. PASP 55 prior to diuresis

## 2022-03-22 ENCOUNTER — INPATIENT (INPATIENT)
Facility: HOSPITAL | Age: 87
LOS: 6 days | Discharge: NURSING, REHAB OR LONG-TERM CARE | End: 2022-03-29
Attending: FAMILY MEDICINE
Payer: MEDICARE

## 2022-03-22 ENCOUNTER — OUTPATIENT (OUTPATIENT)
Dept: OUTPATIENT SERVICES | Facility: HOSPITAL | Age: 87
LOS: 1 days | End: 2022-03-22

## 2022-03-22 DIAGNOSIS — Z90.49 ACQUIRED ABSENCE OF OTHER SPECIFIED PARTS OF DIGESTIVE TRACT: Chronic | ICD-10-CM

## 2022-03-22 PROCEDURE — 99285 EMERGENCY DEPT VISIT HI MDM: CPT

## 2022-03-22 PROCEDURE — 99223 1ST HOSP IP/OBS HIGH 75: CPT

## 2022-03-22 PROCEDURE — 71045 X-RAY EXAM CHEST 1 VIEW: CPT | Mod: 26

## 2022-03-22 PROCEDURE — 93010 ELECTROCARDIOGRAM REPORT: CPT

## 2022-03-22 PROCEDURE — 71250 CT THORAX DX C-: CPT | Mod: 26

## 2022-03-23 ENCOUNTER — OUTPATIENT (OUTPATIENT)
Dept: OUTPATIENT SERVICES | Facility: HOSPITAL | Age: 87
LOS: 1 days | End: 2022-03-23

## 2022-03-23 DIAGNOSIS — Z90.49 ACQUIRED ABSENCE OF OTHER SPECIFIED PARTS OF DIGESTIVE TRACT: Chronic | ICD-10-CM

## 2022-03-23 PROCEDURE — 99233 SBSQ HOSP IP/OBS HIGH 50: CPT

## 2022-03-24 ENCOUNTER — OUTPATIENT (OUTPATIENT)
Dept: OUTPATIENT SERVICES | Facility: HOSPITAL | Age: 87
LOS: 1 days | End: 2022-03-24

## 2022-03-24 DIAGNOSIS — Z90.49 ACQUIRED ABSENCE OF OTHER SPECIFIED PARTS OF DIGESTIVE TRACT: Chronic | ICD-10-CM

## 2022-03-24 PROCEDURE — 99233 SBSQ HOSP IP/OBS HIGH 50: CPT

## 2022-03-25 ENCOUNTER — OUTPATIENT (OUTPATIENT)
Dept: OUTPATIENT SERVICES | Facility: HOSPITAL | Age: 87
LOS: 1 days | End: 2022-03-25

## 2022-03-25 DIAGNOSIS — Z90.49 ACQUIRED ABSENCE OF OTHER SPECIFIED PARTS OF DIGESTIVE TRACT: Chronic | ICD-10-CM

## 2022-03-25 PROCEDURE — 71045 X-RAY EXAM CHEST 1 VIEW: CPT | Mod: 26

## 2022-03-26 ENCOUNTER — OUTPATIENT (OUTPATIENT)
Dept: OUTPATIENT SERVICES | Facility: HOSPITAL | Age: 87
LOS: 1 days | End: 2022-03-26

## 2022-03-26 DIAGNOSIS — Z90.49 ACQUIRED ABSENCE OF OTHER SPECIFIED PARTS OF DIGESTIVE TRACT: Chronic | ICD-10-CM

## 2022-03-27 ENCOUNTER — OUTPATIENT (OUTPATIENT)
Dept: OUTPATIENT SERVICES | Facility: HOSPITAL | Age: 87
LOS: 1 days | End: 2022-03-27

## 2022-03-27 DIAGNOSIS — Z90.49 ACQUIRED ABSENCE OF OTHER SPECIFIED PARTS OF DIGESTIVE TRACT: Chronic | ICD-10-CM

## 2022-03-28 ENCOUNTER — OUTPATIENT (OUTPATIENT)
Dept: OUTPATIENT SERVICES | Facility: HOSPITAL | Age: 87
LOS: 1 days | End: 2022-03-28

## 2022-03-28 DIAGNOSIS — Z90.49 ACQUIRED ABSENCE OF OTHER SPECIFIED PARTS OF DIGESTIVE TRACT: Chronic | ICD-10-CM

## 2022-03-31 DIAGNOSIS — Z20.822 CONTACT WITH AND (SUSPECTED) EXPOSURE TO COVID-19: ICD-10-CM

## 2022-03-31 DIAGNOSIS — I08.3 COMBINED RHEUMATIC DISORDERS OF MITRAL, AORTIC AND TRICUSPID VALVES: ICD-10-CM

## 2022-03-31 DIAGNOSIS — L03.311 CELLULITIS OF ABDOMINAL WALL: ICD-10-CM

## 2022-03-31 DIAGNOSIS — I44.7 LEFT BUNDLE-BRANCH BLOCK, UNSPECIFIED: ICD-10-CM

## 2022-03-31 DIAGNOSIS — I27.20 PULMONARY HYPERTENSION, UNSPECIFIED: ICD-10-CM

## 2022-03-31 DIAGNOSIS — I25.5 ISCHEMIC CARDIOMYOPATHY: ICD-10-CM

## 2022-03-31 DIAGNOSIS — L30.4 ERYTHEMA INTERTRIGO: ICD-10-CM

## 2022-03-31 DIAGNOSIS — I50.23 ACUTE ON CHRONIC SYSTOLIC (CONGESTIVE) HEART FAILURE: ICD-10-CM

## 2022-03-31 DIAGNOSIS — N17.9 ACUTE KIDNEY FAILURE, UNSPECIFIED: ICD-10-CM

## 2022-03-31 DIAGNOSIS — Z95.5 PRESENCE OF CORONARY ANGIOPLASTY IMPLANT AND GRAFT: ICD-10-CM

## 2022-03-31 DIAGNOSIS — E66.9 OBESITY, UNSPECIFIED: ICD-10-CM

## 2022-03-31 DIAGNOSIS — M10.9 GOUT, UNSPECIFIED: ICD-10-CM

## 2022-03-31 DIAGNOSIS — Z66 DO NOT RESUSCITATE: ICD-10-CM

## 2022-03-31 DIAGNOSIS — N18.30 CHRONIC KIDNEY DISEASE, STAGE 3 UNSPECIFIED: ICD-10-CM

## 2022-03-31 DIAGNOSIS — Z51.5 ENCOUNTER FOR PALLIATIVE CARE: ICD-10-CM

## 2022-03-31 DIAGNOSIS — I25.10 ATHEROSCLEROTIC HEART DISEASE OF NATIVE CORONARY ARTERY WITHOUT ANGINA PECTORIS: ICD-10-CM

## 2022-03-31 DIAGNOSIS — I13.0 HYPERTENSIVE HEART AND CHRONIC KIDNEY DISEASE WITH HEART FAILURE AND STAGE 1 THROUGH STAGE 4 CHRONIC KIDNEY DISEASE, OR UNSPECIFIED CHRONIC KIDNEY DISEASE: ICD-10-CM

## 2022-03-31 DIAGNOSIS — R60.9 EDEMA, UNSPECIFIED: ICD-10-CM

## 2022-04-07 ENCOUNTER — OUTPATIENT (OUTPATIENT)
Dept: OUTPATIENT SERVICES | Facility: HOSPITAL | Age: 87
LOS: 1 days | End: 2022-04-07

## 2022-04-07 DIAGNOSIS — D69.6 THROMBOCYTOPENIA, UNSPECIFIED: ICD-10-CM

## 2022-04-07 DIAGNOSIS — N18.9 CHRONIC KIDNEY DISEASE, UNSPECIFIED: ICD-10-CM

## 2022-04-07 DIAGNOSIS — I10 ESSENTIAL (PRIMARY) HYPERTENSION: ICD-10-CM

## 2022-04-07 DIAGNOSIS — I50.9 HEART FAILURE, UNSPECIFIED: ICD-10-CM

## 2022-04-07 DIAGNOSIS — Z90.49 ACQUIRED ABSENCE OF OTHER SPECIFIED PARTS OF DIGESTIVE TRACT: Chronic | ICD-10-CM

## 2022-04-08 ENCOUNTER — APPOINTMENT (OUTPATIENT)
Dept: CARDIOLOGY | Facility: CLINIC | Age: 87
End: 2022-04-08
Payer: MEDICARE

## 2022-04-08 VITALS
DIASTOLIC BLOOD PRESSURE: 50 MMHG | SYSTOLIC BLOOD PRESSURE: 92 MMHG | TEMPERATURE: 96.9 F | WEIGHT: 240 LBS | HEIGHT: 68 IN | OXYGEN SATURATION: 95 % | HEART RATE: 76 BPM | BODY MASS INDEX: 36.37 KG/M2

## 2022-04-08 PROCEDURE — 99215 OFFICE O/P EST HI 40 MIN: CPT

## 2022-04-08 RX ORDER — TIMOLOL MALEATE 5 MG/ML
0.5 SOLUTION OPHTHALMIC
Refills: 0 | Status: DISCONTINUED | COMMUNITY
End: 2022-04-08

## 2022-04-08 RX ORDER — LATANOPROST/PF 0.005 %
0.01 DROPS OPHTHALMIC (EYE)
Refills: 0 | Status: DISCONTINUED | COMMUNITY
End: 2022-04-08

## 2022-04-08 RX ORDER — CYCLOSPORINE 0.5 MG/ML
0.05 EMULSION OPHTHALMIC
Qty: 60 | Refills: 0 | Status: DISCONTINUED | COMMUNITY
Start: 2022-02-09 | End: 2022-04-08

## 2022-04-08 NOTE — HISTORY OF PRESENT ILLNESS
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  I reviewed all records patient was diuresed aggressively with an MILLIE now improved to near baseline 1.5.  Was discharged about a week ago and underwent lab work post hospitalization yesterday.  The results are fairly promising as detailed below.  He feels much improved after discharge.  Compliant with medications.  Wife present.  Improved dyspnea.  Still with lower extremity edema.  Rash/cellulitis in pannus improved\par \par VISIT 3/2022: He reports being more fatigued and short of breath with mild exertion.  Difficulty sleeping.  Increasing lower extremity edema and weight gain over the past few months.  Interim lab work showed increasing BUN to creatinine ratio.  I added on lab work today to [facility] which I received and shows that his BUN is now 60 and creatinine 1.5.  He does urinate more with the Bumex but this has dropped off a bit.  Denies angina.  EKG today with unchanged left bundle branch block.\par He also was evaluated in the interim by EP and declines procedures at present.\par Has a severe intertriginous fungal rash.\par \par \par TESTING:\par 4/2022 LABWORK: Creatinine 1.5.  BUN 41.  Normal LFT.  Hemoglobin 12.3.  Platelets improved to 242.\par \par 3/2022 LABWORK [facility]: BUN 60. Cr 1.5. LFT mild elevated. LDL 19. normal ferritin. Hgb 11.8. \par 1/2022 [facility] TTE: EF 35 to 40%.  PASP 68.  Severe left atrial enlargement.  Mild MR, mild mild AS, moderate AI.  Normal RV function.\par Labs 11/2021 Cr 1.2. \par Cath [facility] 10/2021: PCI mid RCA. edp 24\par Labs 10/2021: Hgb 10.5. Cr 1.38. \par EKG 9/15/2021 left bundle with PVCs.  .\par Cath [place] 9/9/2021: PCI mid LAD.  Residual mid RCA.  EDP 22.\par cath 8/23/21: mid lad 1,1,0 across D2 and mid RCA stenosis. mild elevated r/l pressures preserved index\par labwork: cr 1.2, hgb 12.7, LDL 29. \par Echo 8/2021: Ef 30% inferior rwma. mild LAE, mod AS, mod AR, mod MR, mod TR. PASP 55 prior to diuresis

## 2022-04-08 NOTE — DISCUSSION/SUMMARY
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  Continue Coreg 3.125 twice daily and Entresto 24/26 twice daily\par - EF assessments: ordered complete echocardiogram with [facility] for EF assessment in 6 to 8 weeks with return visit.  Continue monitoring for indication for CRT-D\par -Restart cardiac rehab\par - volume optimization: Continue Bumex 1 daily.  Monitor status in cardiac rehab.  Weight assessments.  Elevate lower extremities and consider compression stockings with avoid hinging\par -Continue aspirin monotherapy and atorva 80 lipids at goal\par \par # Moderate mixed valve disease as detailed: stable for now\par - serial echo ordered, rest as detailed above\par \par # CKD closely monitor: i think baseline will be 1.4-1.5.\par \par \par Follow-up in 6 weeks with same-day [facility] echocardiogram and lab work.  ER precautions given to patient.\par \par

## 2022-04-08 NOTE — PHYSICAL EXAM
[Well Developed] : well developed [Well Nourished] : well nourished [No Acute Distress] : no acute distress [Obese] : obese [Normal Conjunctiva] : normal conjunctiva [Normal S1, S2] : normal S1, S2 [No Rub] : no rub [No Respiratory Distress] : no respiratory distress  [Soft] : abdomen soft [Non Tender] : non-tender [Normal Bowel Sounds] : normal bowel sounds [Moves all extremities] : moves all extremities [Alert and Oriented] : alert and oriented [Normal memory] : normal memory [de-identified] : JVPE improved to 12 [de-identified] : No further crackles [de-identified] : walker, arthritic gait [de-identified] : 2+ edema LE, brawny skin [de-identified] : fungal rash improved

## 2022-04-11 DIAGNOSIS — I50.21 ACUTE SYSTOLIC (CONGESTIVE) HEART FAILURE: ICD-10-CM

## 2022-04-11 DIAGNOSIS — I25.10 ATHEROSCLEROTIC HEART DISEASE OF NATIVE CORONARY ARTERY WITHOUT ANGINA PECTORIS: ICD-10-CM

## 2022-04-11 DIAGNOSIS — L03.90 CELLULITIS, UNSPECIFIED: ICD-10-CM

## 2022-04-12 DIAGNOSIS — I50.21 ACUTE SYSTOLIC (CONGESTIVE) HEART FAILURE: ICD-10-CM

## 2022-04-12 DIAGNOSIS — I25.10 ATHEROSCLEROTIC HEART DISEASE OF NATIVE CORONARY ARTERY WITHOUT ANGINA PECTORIS: ICD-10-CM

## 2022-04-12 DIAGNOSIS — L03.90 CELLULITIS, UNSPECIFIED: ICD-10-CM

## 2022-04-13 DIAGNOSIS — I25.10 ATHEROSCLEROTIC HEART DISEASE OF NATIVE CORONARY ARTERY WITHOUT ANGINA PECTORIS: ICD-10-CM

## 2022-04-13 DIAGNOSIS — I50.21 ACUTE SYSTOLIC (CONGESTIVE) HEART FAILURE: ICD-10-CM

## 2022-04-13 DIAGNOSIS — L03.90 CELLULITIS, UNSPECIFIED: ICD-10-CM

## 2022-04-14 DIAGNOSIS — L03.90 CELLULITIS, UNSPECIFIED: ICD-10-CM

## 2022-04-14 DIAGNOSIS — I50.21 ACUTE SYSTOLIC (CONGESTIVE) HEART FAILURE: ICD-10-CM

## 2022-04-14 DIAGNOSIS — I25.10 ATHEROSCLEROTIC HEART DISEASE OF NATIVE CORONARY ARTERY WITHOUT ANGINA PECTORIS: ICD-10-CM

## 2022-04-18 DIAGNOSIS — I50.21 ACUTE SYSTOLIC (CONGESTIVE) HEART FAILURE: ICD-10-CM

## 2022-04-18 DIAGNOSIS — I25.10 ATHEROSCLEROTIC HEART DISEASE OF NATIVE CORONARY ARTERY WITHOUT ANGINA PECTORIS: ICD-10-CM

## 2022-04-18 DIAGNOSIS — L03.90 CELLULITIS, UNSPECIFIED: ICD-10-CM

## 2022-04-26 ENCOUNTER — OUTPATIENT (OUTPATIENT)
Dept: OUTPATIENT SERVICES | Facility: HOSPITAL | Age: 87
LOS: 1 days | End: 2022-04-26

## 2022-04-26 DIAGNOSIS — Z90.49 ACQUIRED ABSENCE OF OTHER SPECIFIED PARTS OF DIGESTIVE TRACT: Chronic | ICD-10-CM

## 2022-04-26 DIAGNOSIS — I25.10 ATHEROSCLEROTIC HEART DISEASE OF NATIVE CORONARY ARTERY WITHOUT ANGINA PECTORIS: ICD-10-CM

## 2022-06-02 ENCOUNTER — OUTPATIENT (OUTPATIENT)
Dept: OUTPATIENT SERVICES | Facility: HOSPITAL | Age: 87
LOS: 1 days | End: 2022-06-02

## 2022-06-02 DIAGNOSIS — I25.5 ISCHEMIC CARDIOMYOPATHY: ICD-10-CM

## 2022-06-02 DIAGNOSIS — I10 ESSENTIAL (PRIMARY) HYPERTENSION: ICD-10-CM

## 2022-06-02 DIAGNOSIS — I50.20 UNSPECIFIED SYSTOLIC (CONGESTIVE) HEART FAILURE: ICD-10-CM

## 2022-06-02 DIAGNOSIS — I44.7 LEFT BUNDLE-BRANCH BLOCK, UNSPECIFIED: ICD-10-CM

## 2022-06-02 DIAGNOSIS — I25.10 ATHEROSCLEROTIC HEART DISEASE OF NATIVE CORONARY ARTERY WITHOUT ANGINA PECTORIS: ICD-10-CM

## 2022-06-02 DIAGNOSIS — Z90.49 ACQUIRED ABSENCE OF OTHER SPECIFIED PARTS OF DIGESTIVE TRACT: Chronic | ICD-10-CM

## 2022-06-06 ENCOUNTER — APPOINTMENT (OUTPATIENT)
Dept: CARDIOLOGY | Facility: CLINIC | Age: 87
End: 2022-06-06
Payer: MEDICARE

## 2022-06-06 VITALS
OXYGEN SATURATION: 96 % | HEART RATE: 63 BPM | BODY MASS INDEX: 34.86 KG/M2 | HEIGHT: 68 IN | TEMPERATURE: 97.1 F | WEIGHT: 230 LBS | SYSTOLIC BLOOD PRESSURE: 114 MMHG | DIASTOLIC BLOOD PRESSURE: 76 MMHG

## 2022-06-06 DIAGNOSIS — I38 ENDOCARDITIS, VALVE UNSPECIFIED: ICD-10-CM

## 2022-06-06 PROCEDURE — 96374 THER/PROPH/DIAG INJ IV PUSH: CPT | Mod: 59

## 2022-06-06 PROCEDURE — 93306 TTE W/DOPPLER COMPLETE: CPT

## 2022-06-06 PROCEDURE — 99214 OFFICE O/P EST MOD 30 MIN: CPT | Mod: 25

## 2022-06-09 NOTE — PHYSICAL EXAM
[Well Developed] : well developed [Well Nourished] : well nourished [No Acute Distress] : no acute distress [Obese] : obese [Normal Conjunctiva] : normal conjunctiva [Normal S1, S2] : normal S1, S2 [No Rub] : no rub [No Respiratory Distress] : no respiratory distress  [Soft] : abdomen soft [Non Tender] : non-tender [Normal Bowel Sounds] : normal bowel sounds [Moves all extremities] : moves all extremities [Alert and Oriented] : alert and oriented [Normal memory] : normal memory [de-identified] : JVPE improved to 12 [de-identified] : No further crackles [de-identified] : walker, arthritic gait [de-identified] : 1+ edema LE, brawny skin [de-identified] : fungal rash improved

## 2022-06-09 NOTE — HISTORY OF PRESENT ILLNESS
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 wife and family very happy. no angina, improved tremendously. labs reviewed. \par \par VISIT 4/2022: Follow-up after hospitalization for acutely decompensated systolic heart failure.  I reviewed all records patient was diuresed aggressively with an MILLIE now improved to near baseline 1.5.  Was discharged about a week ago and underwent lab work post hospitalization yesterday.  The results are fairly promising as detailed below.  He feels much improved after discharge.  Compliant with medications.  Wife present.  Improved dyspnea.  Still with lower extremity edema.  Rash/cellulitis in pannus improved\par \par VISIT 3/2022: He reports being more fatigued and short of breath with mild exertion.  Difficulty sleeping.  Increasing lower extremity edema and weight gain over the past few months.  Interim lab work showed increasing BUN to creatinine ratio.  I added on lab work today to [facility] which I received and shows that his BUN is now 60 and creatinine 1.5.  He does urinate more with the Bumex but this has dropped off a bit.  Denies angina.  EKG today with unchanged left bundle branch block.\par He also was evaluated in the interim by EP and declines procedures at present.\par Has a severe intertriginous fungal rash.\par \par \par TESTING:\par 6/2022 LABWORK: hgb 12.7. cr 1.4. k 5.2. plt 142. \par TTE: EF 30%. pasp 60. severe LAE. mod AI/mod AS. mod MR. \par \par 4/2022 LABWORK: Creatinine 1.5.  BUN 41.  Normal LFT.  Hemoglobin 12.3.  Platelets improved to 242.\par \par 3/2022 LABWORK [facility]: BUN 60. Cr 1.5. LFT mild elevated. LDL 19. normal ferritin. Hgb 11.8. \par 1/2022 [facility] TTE: EF 35 to 40%.  PASP 68.  Severe left atrial enlargement.  Mild MR, mild mild AS, moderate AI.  Normal RV function.\par Labs 11/2021 Cr 1.2. \par Cath [facility] 10/2021: PCI mid RCA. edp 24\par Labs 10/2021: Hgb 10.5. Cr 1.38. \par EKG 9/15/2021 left bundle with PVCs.  .\par Cath [place] 9/9/2021: PCI mid LAD.  Residual mid RCA.  EDP 22.\par cath 8/23/21: mid lad 1,1,0 across D2 and mid RCA stenosis. mild elevated r/l pressures preserved index\par labwork: cr 1.2, hgb 12.7, LDL 29. \par Echo 8/2021: Ef 30% inferior rwma. mild LAE, mod AS, mod AR, mod MR, mod TR. PASP 55 prior to diuresis

## 2022-06-09 NOTE — DISCUSSION/SUMMARY
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  Continue Coreg 3.125 twice daily and Entresto 24/26 twice daily\par - discussed again with patient/family regarding CRT-P or D however they are happy with his progress (although TTE worsening), saw EP, no plan for procedures.\par - continue cardiac rehab\par - volume optimization: Continue Bumex 1 daily.  Monitor status in cardiac rehab.  Weight assessments.  Elevate lower extremities and consider compression stockings with avoid hinging\par -Continue aspirin monotherapy and atorva 80 lipids at goal\par \par # Moderate mixed valve disease as detailed: stable for now\par - serial echo q6-12 months, rest as detailed above\par \par # CKD closely monitor: i think baseline will be 1.4-1.5.\par \par \par Follow-up in 3 months with labs. \par \par

## 2022-06-20 ENCOUNTER — APPOINTMENT (OUTPATIENT)
Dept: CARDIOLOGY | Facility: CLINIC | Age: 87
End: 2022-06-20

## 2022-06-22 ENCOUNTER — RX RENEWAL (OUTPATIENT)
Age: 87
End: 2022-06-22

## 2022-08-16 ENCOUNTER — NON-APPOINTMENT (OUTPATIENT)
Age: 87
End: 2022-08-16

## 2022-09-01 ENCOUNTER — RX RENEWAL (OUTPATIENT)
Age: 87
End: 2022-09-01

## 2022-09-28 ENCOUNTER — APPOINTMENT (OUTPATIENT)
Dept: CARDIOLOGY | Facility: CLINIC | Age: 87
End: 2022-09-28

## 2022-09-28 VITALS
HEIGHT: 68 IN | WEIGHT: 240 LBS | DIASTOLIC BLOOD PRESSURE: 66 MMHG | SYSTOLIC BLOOD PRESSURE: 118 MMHG | OXYGEN SATURATION: 96 % | BODY MASS INDEX: 36.37 KG/M2 | HEART RATE: 66 BPM | TEMPERATURE: 96.8 F

## 2022-09-28 PROCEDURE — 99215 OFFICE O/P EST HI 40 MIN: CPT

## 2022-09-28 RX ORDER — POLYVINYL ALCOHOL 14 MG/ML
1.4 SOLUTION/ DROPS OPHTHALMIC
Refills: 0 | Status: DISCONTINUED | COMMUNITY
End: 2022-09-28

## 2022-09-28 RX ORDER — ASPIRIN ENTERIC COATED TABLETS 81 MG 81 MG/1
81 TABLET, DELAYED RELEASE ORAL DAILY
Qty: 90 | Refills: 3 | Status: ACTIVE | COMMUNITY

## 2022-09-28 NOTE — PHYSICAL EXAM
[Well Developed] : well developed [Well Nourished] : well nourished [No Acute Distress] : no acute distress [Obese] : obese [Normal Conjunctiva] : normal conjunctiva [Normal S1, S2] : normal S1, S2 [No Rub] : no rub [No Respiratory Distress] : no respiratory distress  [Soft] : abdomen soft [Non Tender] : non-tender [Normal Bowel Sounds] : normal bowel sounds [Moves all extremities] : moves all extremities [Alert and Oriented] : alert and oriented [Normal memory] : normal memory [de-identified] : JVPE improved to 10 [de-identified] : No further crackles [de-identified] : walker, arthritic gait [de-identified] : trace edema LE, brawny skin [de-identified] : fungal rash improved

## 2022-09-28 NOTE — DISCUSSION/SUMMARY
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 on optimal goal-directed medical therapy with SBP 90s.  Continue Coreg 3.125 twice daily and Entresto 24/26 twice daily. \par - discussed again with patient/family regarding CRT-P or D however they are happy with his progress (although TTE worsening), saw EP, no plan for procedures.\par - encouraged completing cardiac rehab\par - volume optimization: Continue Bumex 1 daily.  Monitor status in cardiac rehab.  Weight assessments.  Elevate lower extremities and consider compression stockings with avoid hinging\par -Continue aspirin monotherapy and atorva 80 lipids at goal\par \par # Moderate mixed valve disease as detailed: stable for now\par - serial echo q6-12 months, rest as detailed above\par \par # CKD closely monitor: i think baseline will be 1.3-1.5.\par \par labs 4 weeks after jardiance. Follow-up in 6 months with labs and echo.  ER precautions given to patient.\par \par

## 2022-09-28 NOTE — HISTORY OF PRESENT ILLNESS
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 weight increase. labwork stable. feels well. happy. does not do cardiac rehab because "does not do anything for him." he is remaining active and going up/down well. 6 sessions left. 40 minutes [employer]. \par \par VISIT 6/2022: feels great, stronger, in cardiac rehab. wife and family very happy. no angina, improved tremendously. labs reviewed. \par \par VISIT 4/2022: Follow-up after hospitalization for acutely decompensated systolic heart failure.  I reviewed all records patient was diuresed aggressively with an MILLIE now improved to near baseline 1.5.  Was discharged about a week ago and underwent lab work post hospitalization yesterday.  The results are fairly promising as detailed below.  He feels much improved after discharge.  Compliant with medications.  Wife present.  Improved dyspnea.  Still with lower extremity edema.  Rash/cellulitis in pannus improved\par \par VISIT 3/2022: He reports being more fatigued and short of breath with mild exertion.  Difficulty sleeping.  Increasing lower extremity edema and weight gain over the past few months.  Interim lab work showed increasing BUN to creatinine ratio.  I added on lab work today to [facility] which I received and shows that his BUN is now 60 and creatinine 1.5.  He does urinate more with the Bumex but this has dropped off a bit.  Denies angina.  EKG today with unchanged left bundle branch block.\par He also was evaluated in the interim by EP and declines procedures at present.\par Has a severe intertriginous fungal rash.\par \par \par TESTING:\par 9/2022 LABWORK: BUN 38.  Creatinine 1.3.  Hemoglobin 12.6.  Potassium 4.7.  Lipid optimized\par \par 6/2022 LABWORK: hgb 12.7. cr 1.4. k 5.2. plt 142. \par TTE: EF 30%. pasp 60. severe LAE. mod AI/mod AS. mod MR. \par \par 4/2022 LABWORK: Creatinine 1.5.  BUN 41.  Normal LFT.  Hemoglobin 12.3.  Platelets improved to 242.\par \par 3/2022 LABWORK [facility]: BUN 60. Cr 1.5. LFT mild elevated. LDL 19. normal ferritin. Hgb 11.8. \par 1/2022 [facility] TTE: EF 35 to 40%.  PASP 68.  Severe left atrial enlargement.  Mild MR, mild mild AS, moderate AI.  Normal RV function.\par Labs 11/2021 Cr 1.2. \par Cath [facility] 10/2021: PCI mid RCA. edp 24\par Labs 10/2021: Hgb 10.5. Cr 1.38. \par EKG 9/15/2021 left bundle with PVCs.  .\par Cath [place] 9/9/2021: PCI mid LAD.  Residual mid RCA.  EDP 22.\par cath 8/23/21: mid lad 1,1,0 across D2 and mid RCA stenosis. mild elevated r/l pressures preserved index\par labwork: cr 1.2, hgb 12.7, LDL 29. \par Echo 8/2021: Ef 30% inferior rwma. mild LAE, mod AS, mod AR, mod MR, mod TR. PASP 55 prior to diuresis

## 2022-10-05 ENCOUNTER — TRANSCRIPTION ENCOUNTER (OUTPATIENT)
Age: 87
End: 2022-10-05

## 2022-10-06 ENCOUNTER — OUTPATIENT (OUTPATIENT)
Dept: OUTPATIENT SERVICES | Facility: HOSPITAL | Age: 87
LOS: 1 days | End: 2022-10-06

## 2022-10-06 ENCOUNTER — APPOINTMENT (OUTPATIENT)
Dept: DISASTER EMERGENCY | Facility: HOSPITAL | Age: 87
End: 2022-10-06

## 2022-10-06 DIAGNOSIS — U07.1 COVID-19: ICD-10-CM

## 2022-10-06 DIAGNOSIS — Z90.49 ACQUIRED ABSENCE OF OTHER SPECIFIED PARTS OF DIGESTIVE TRACT: Chronic | ICD-10-CM

## 2022-10-09 ENCOUNTER — RX CHANGE (OUTPATIENT)
Age: 87
End: 2022-10-09

## 2022-10-10 ENCOUNTER — NON-APPOINTMENT (OUTPATIENT)
Age: 87
End: 2022-10-10

## 2022-10-11 RX ORDER — DAPAGLIFLOZIN 10 MG/1
10 TABLET, FILM COATED ORAL DAILY
Qty: 90 | Refills: 1 | Status: DISCONTINUED | COMMUNITY
Start: 2022-09-28 | End: 2022-10-11

## 2022-10-12 ENCOUNTER — NON-APPOINTMENT (OUTPATIENT)
Age: 87
End: 2022-10-12

## 2023-01-01 ENCOUNTER — NON-APPOINTMENT (OUTPATIENT)
Age: 88
End: 2023-01-01

## 2023-01-01 ENCOUNTER — RX RENEWAL (OUTPATIENT)
Age: 88
End: 2023-01-01

## 2023-01-01 ENCOUNTER — LABORATORY RESULT (OUTPATIENT)
Age: 88
End: 2023-01-01

## 2023-01-01 ENCOUNTER — APPOINTMENT (OUTPATIENT)
Dept: CARDIOLOGY | Facility: CLINIC | Age: 88
End: 2023-01-01
Payer: MEDICARE

## 2023-01-01 ENCOUNTER — RX CHANGE (OUTPATIENT)
Age: 88
End: 2023-01-01

## 2023-01-01 ENCOUNTER — APPOINTMENT (OUTPATIENT)
Dept: CT IMAGING | Facility: CLINIC | Age: 88
End: 2023-01-01
Payer: MEDICARE

## 2023-01-01 VITALS
SYSTOLIC BLOOD PRESSURE: 102 MMHG | OXYGEN SATURATION: 96 % | BODY MASS INDEX: 36.37 KG/M2 | WEIGHT: 240 LBS | HEART RATE: 73 BPM | DIASTOLIC BLOOD PRESSURE: 60 MMHG | HEIGHT: 68 IN

## 2023-01-01 VITALS
BODY MASS INDEX: 37.89 KG/M2 | SYSTOLIC BLOOD PRESSURE: 110 MMHG | HEART RATE: 68 BPM | WEIGHT: 250 LBS | HEIGHT: 68 IN | OXYGEN SATURATION: 97 % | DIASTOLIC BLOOD PRESSURE: 76 MMHG

## 2023-01-01 VITALS
DIASTOLIC BLOOD PRESSURE: 66 MMHG | WEIGHT: 245 LBS | SYSTOLIC BLOOD PRESSURE: 106 MMHG | HEART RATE: 73 BPM | HEIGHT: 68 IN | BODY MASS INDEX: 37.13 KG/M2 | OXYGEN SATURATION: 95 %

## 2023-01-01 VITALS
BODY MASS INDEX: 37.13 KG/M2 | HEIGHT: 68 IN | DIASTOLIC BLOOD PRESSURE: 62 MMHG | HEART RATE: 75 BPM | SYSTOLIC BLOOD PRESSURE: 98 MMHG | OXYGEN SATURATION: 94 % | WEIGHT: 245 LBS

## 2023-01-01 VITALS
SYSTOLIC BLOOD PRESSURE: 106 MMHG | OXYGEN SATURATION: 95 % | DIASTOLIC BLOOD PRESSURE: 48 MMHG | BODY MASS INDEX: 38.01 KG/M2 | HEART RATE: 65 BPM | WEIGHT: 250 LBS

## 2023-01-01 VITALS
OXYGEN SATURATION: 96 % | BODY MASS INDEX: 37.44 KG/M2 | WEIGHT: 247 LBS | HEART RATE: 62 BPM | DIASTOLIC BLOOD PRESSURE: 64 MMHG | HEIGHT: 68 IN | SYSTOLIC BLOOD PRESSURE: 98 MMHG

## 2023-01-01 VITALS
WEIGHT: 240 LBS | DIASTOLIC BLOOD PRESSURE: 58 MMHG | BODY MASS INDEX: 36.37 KG/M2 | OXYGEN SATURATION: 95 % | SYSTOLIC BLOOD PRESSURE: 100 MMHG | HEART RATE: 76 BPM | HEIGHT: 68 IN

## 2023-01-01 DIAGNOSIS — C18.9 MALIGNANT NEOPLASM OF COLON, UNSPECIFIED: ICD-10-CM

## 2023-01-01 DIAGNOSIS — D69.6 THROMBOCYTOPENIA, UNSPECIFIED: ICD-10-CM

## 2023-01-01 DIAGNOSIS — E66.9 OBESITY, UNSPECIFIED: ICD-10-CM

## 2023-01-01 PROCEDURE — 99215 OFFICE O/P EST HI 40 MIN: CPT | Mod: 25

## 2023-01-01 PROCEDURE — 99215 OFFICE O/P EST HI 40 MIN: CPT

## 2023-01-01 PROCEDURE — 93306 TTE W/DOPPLER COMPLETE: CPT

## 2023-01-01 PROCEDURE — 93000 ELECTROCARDIOGRAM COMPLETE: CPT

## 2023-01-01 PROCEDURE — 99214 OFFICE O/P EST MOD 30 MIN: CPT

## 2023-01-01 PROCEDURE — 71250 CT THORAX DX C-: CPT | Mod: MH

## 2023-01-01 RX ORDER — CARVEDILOL 3.12 MG/1
3.12 TABLET, FILM COATED ORAL TWICE DAILY
Qty: 180 | Refills: 3 | Status: DISCONTINUED | COMMUNITY
Start: 1900-01-01 | End: 2023-01-01

## 2023-01-01 RX ORDER — BUMETANIDE 1 MG/1
1 TABLET ORAL TWICE DAILY
Refills: 0 | Status: DISCONTINUED | COMMUNITY
End: 2023-01-01

## 2023-01-01 RX ORDER — LATANOPROST/PF 0.005 %
0.01 DROPS OPHTHALMIC (EYE)
Refills: 0 | Status: ACTIVE | COMMUNITY

## 2023-01-01 RX ORDER — LIFITEGRAST 50 MG/ML
5 SOLUTION/ DROPS OPHTHALMIC
Refills: 0 | Status: ACTIVE | COMMUNITY

## 2023-01-01 RX ORDER — ATORVASTATIN CALCIUM 40 MG/1
40 TABLET, FILM COATED ORAL
Qty: 90 | Refills: 3 | Status: ACTIVE | COMMUNITY
Start: 2023-01-01 | End: 1900-01-01

## 2023-01-01 RX ORDER — ATORVASTATIN CALCIUM 80 MG/1
80 TABLET, FILM COATED ORAL DAILY
Qty: 90 | Refills: 3 | Status: DISCONTINUED | COMMUNITY
Start: 1900-01-01 | End: 2023-01-01

## 2023-01-01 RX ORDER — ATORVASTATIN CALCIUM 40 MG/1
40 TABLET, FILM COATED ORAL
Refills: 0 | Status: DISCONTINUED
End: 2023-01-01

## 2023-01-01 RX ORDER — TORSEMIDE 100 MG/1
100 TABLET ORAL TWICE DAILY
Qty: 180 | Refills: 1 | Status: ACTIVE | COMMUNITY
Start: 2023-01-01 | End: 1900-01-01

## 2023-01-01 RX ORDER — LATANOPROSTENE BUNOD 0.24 MG/ML
0.02 SOLUTION/ DROPS OPHTHALMIC
Refills: 0 | Status: DISCONTINUED | COMMUNITY
End: 2023-01-01

## 2023-01-01 RX ORDER — TORSEMIDE 20 MG/1
20 TABLET ORAL TWICE DAILY
Qty: 360 | Refills: 0 | Status: DISCONTINUED | COMMUNITY
Start: 2023-01-01 | End: 2023-01-01

## 2023-01-01 RX ORDER — NYSTATIN 100000 [USP'U]/G
100000 POWDER TOPICAL
Qty: 1 | Refills: 1 | Status: ACTIVE | COMMUNITY
Start: 2022-03-21 | End: 1900-01-01

## 2023-01-01 RX ORDER — EMPAGLIFLOZIN 10 MG/1
10 TABLET, FILM COATED ORAL DAILY
Qty: 90 | Refills: 3 | Status: ACTIVE | COMMUNITY
Start: 2022-10-12 | End: 1900-01-01

## 2023-01-01 RX ORDER — TORSEMIDE 40 MG/1
40 TABLET, FILM COATED ORAL DAILY
Refills: 0 | Status: ACTIVE | COMMUNITY

## 2023-01-01 RX ORDER — POLYVINYL ALCOHOL, POVIDONE 14; 6 MG/ML; MG/ML
1.4-0.6 SOLUTION/ DROPS OPHTHALMIC
Refills: 0 | Status: ACTIVE | COMMUNITY

## 2023-01-01 RX ORDER — NYSTATIN 100000 1/G
100000 POWDER TOPICAL
Qty: 60 | Refills: 1 | Status: ACTIVE | COMMUNITY
Start: 2021-09-16 | End: 1900-01-01

## 2023-01-01 RX ORDER — LATANOPROST/PF 0.005 %
0.01 DROPS OPHTHALMIC (EYE)
Refills: 0 | Status: DISCONTINUED | COMMUNITY
End: 2023-01-01

## 2023-01-01 RX ORDER — SACUBITRIL AND VALSARTAN 24; 26 MG/1; MG/1
24-26 TABLET, FILM COATED ORAL TWICE DAILY
Qty: 180 | Refills: 3 | Status: ACTIVE | COMMUNITY
Start: 2021-10-22 | End: 1900-01-01

## 2023-01-01 RX ORDER — BENZONATATE 150 MG/1
CAPSULE ORAL
Refills: 0 | Status: DISCONTINUED | COMMUNITY
End: 2023-01-01

## 2023-01-01 RX ORDER — TORSEMIDE 20 MG/1
20 TABLET ORAL
Qty: 180 | Refills: 1 | Status: DISCONTINUED | COMMUNITY
Start: 2023-01-01 | End: 2023-01-01

## 2023-01-01 RX ORDER — BUMETANIDE 1 MG/1
1 TABLET ORAL
Qty: 90 | Refills: 3 | Status: DISCONTINUED | COMMUNITY
Start: 2023-02-02 | End: 2023-01-01

## 2023-01-01 RX ORDER — BRIMONIDINE TARTRATE 1 MG/ML
0.1 SOLUTION/ DROPS OPHTHALMIC
Refills: 0 | Status: ACTIVE | COMMUNITY

## 2023-01-01 RX ORDER — METOPROLOL SUCCINATE 25 MG/1
25 TABLET, EXTENDED RELEASE ORAL DAILY
Qty: 90 | Refills: 1 | Status: ACTIVE | COMMUNITY
Start: 2023-01-01 | End: 1900-01-01

## 2023-01-01 RX ORDER — COLCHICINE 0.6 MG/1
0.6 TABLET ORAL DAILY
Qty: 90 | Refills: 1 | Status: ACTIVE | COMMUNITY
Start: 2023-01-01 | End: 1900-01-01

## 2023-01-04 ENCOUNTER — NON-APPOINTMENT (OUTPATIENT)
Age: 88
End: 2023-01-04

## 2023-02-01 ENCOUNTER — NON-APPOINTMENT (OUTPATIENT)
Age: 88
End: 2023-02-01

## 2023-04-12 NOTE — HISTORY OF PRESENT ILLNESS
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 had covid 10/2022 unfortunately. he has had worsening dyspnea and weight gain over past 2 months. jardiance approved instead of farxiga. echo today grossly unchanged. \par \par VISIT 9/2022: feels great. weight increase. labwork stable. feels well. happy. does not do cardiac rehab because "does not do anything for him." he is remaining active and going up/down well. 6 sessions left. 40 minutes [employer]. \par \par VISIT 6/2022: feels great, stronger, in cardiac rehab. wife and family very happy. no angina, improved tremendously. labs reviewed. \par \par VISIT 4/2022: Follow-up after hospitalization for acutely decompensated systolic heart failure.  I reviewed all records patient was diuresed aggressively with an MILLIE now improved to near baseline 1.5.  Was discharged about a week ago and underwent lab work post hospitalization yesterday.  The results are fairly promising as detailed below.  He feels much improved after discharge.  Compliant with medications.  Wife present.  Improved dyspnea.  Still with lower extremity edema.  Rash/cellulitis in pannus improved\par \par VISIT 3/2022: He reports being more fatigued and short of breath with mild exertion.  Difficulty sleeping.  Increasing lower extremity edema and weight gain over the past few months.  Interim lab work showed increasing BUN to creatinine ratio.  I added on lab work today to [facility] which I received and shows that his BUN is now 60 and creatinine 1.5.  He does urinate more with the Bumex but this has dropped off a bit.  Denies angina.  EKG today with unchanged left bundle branch block.\par He also was evaluated in the interim by EP and declines procedures at present.\par Has a severe intertriginous fungal rash.\par \par \par TESTING:\par 4/2023 TTE looks to have low flow low gradient AS. ef 25%. pasp 58. \par \par 11/2022 LABWORK: Cr 1.4. K 4.8. bun 42. . lft. \par \par 9/2022 LABWORK: BUN 38.  Creatinine 1.3.  Hemoglobin 12.6.  Potassium 4.7.  Lipid optimized\par \par 6/2022 LABWORK: hgb 12.7. cr 1.4. k 5.2. plt 142. \par TTE: EF 30%. pasp 60. severe LAE. mod AI/mod AS. mod MR. \par \par 4/2022 LABWORK: Creatinine 1.5.  BUN 41.  Normal LFT.  Hemoglobin 12.3.  Platelets improved to 242.\par \par 3/2022 LABWORK [facility]: BUN 60. Cr 1.5. LFT mild elevated. LDL 19. normal ferritin. Hgb 11.8. \par 1/2022 [facility] TTE: EF 35 to 40%.  PASP 68.  Severe left atrial enlargement.  Mild MR, mild mild AS, moderate AI.  Normal RV function.\par Labs 11/2021 Cr 1.2. \par Cath [facility] 10/2021: PCI mid RCA. edp 24\par Labs 10/2021: Hgb 10.5. Cr 1.38. \par EKG 9/15/2021 left bundle with PVCs.  .\par Cath [place] 9/9/2021: PCI mid LAD.  Residual mid RCA.  EDP 22.\par cath 8/23/21: mid lad 1,1,0 across D2 and mid RCA stenosis. mild elevated r/l pressures preserved index\par labwork: cr 1.2, hgb 12.7, LDL 29. \par Echo 8/2021: Ef 30% inferior rwma. mild LAE, mod AS, mod AR, mod MR, mod TR. PASP 55 prior to diuresis\par

## 2023-04-12 NOTE — DISCUSSION/SUMMARY
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 ordered CT chest too. \par - Diuretic: increase bumex to 1 bid, labwork. \par - GDMT: SGTL2i. BB/ARNI. \par - discussed again with patient/family regarding CRT-P or D and they are willing to discuss at close follow up visit\par - at f/u visit, I think LHC/RHC/cardiomems is a good idea to guide mgmt \par - might benefit from HF consultation\par - encouraged completing cardiac rehab \par - Continue aspirin monotherapy and atorva 80 lipids at goal\par \par # Moderate/severe mixed valve disease as detailed: stable for now. as above. could be LFLG AS as pullback gradients on caths are not significant. \par \par # CKD closely monitor: i think baseline will be 1.3-1.5.\par \par Follow within 1 month. labs and ct chest.  ER precautions given to patient.\par \par

## 2023-04-12 NOTE — PHYSICAL EXAM
[Well Developed] : well developed [Well Nourished] : well nourished [No Acute Distress] : no acute distress [Obese] : obese [Normal Conjunctiva] : normal conjunctiva [Normal S1, S2] : normal S1, S2 [No Rub] : no rub [No Respiratory Distress] : no respiratory distress  [Soft] : abdomen soft [Non Tender] : non-tender [Normal Bowel Sounds] : normal bowel sounds [Moves all extremities] : moves all extremities [Alert and Oriented] : alert and oriented [Normal memory] : normal memory [de-identified] : JVPE higher 15 [de-identified] : no crackles but has exp wheezing  [de-identified] : walker, arthritic gait [de-identified] : 1-2+ edema LE, brawny skin

## 2023-05-17 NOTE — PHYSICAL EXAM
[Well Developed] : well developed [Well Nourished] : well nourished [No Acute Distress] : no acute distress [Obese] : obese [Normal Conjunctiva] : normal conjunctiva [Normal S1, S2] : normal S1, S2 [No Rub] : no rub [No Respiratory Distress] : no respiratory distress  [Soft] : abdomen soft [Non Tender] : non-tender [Normal Bowel Sounds] : normal bowel sounds [Moves all extremities] : moves all extremities [Alert and Oriented] : alert and oriented [Normal memory] : normal memory [de-identified] : JVPE higher 9 [de-identified] : no crackles [de-identified] : walker, arthritic gait [de-identified] : 1+ edema LE, brawny skin

## 2023-05-17 NOTE — HISTORY OF PRESENT ILLNESS
Anesthesia Evaluation     Patient summary reviewed and Nursing notes reviewed   no history of anesthetic complications:  NPO Solid Status: > 8 hours  NPO Liquid Status: > 2 hours           Airway   Mallampati: II  TM distance: >3 FB  Neck ROM: full  no difficulty expected  Dental - normal exam     Pulmonary - normal exam   (+) a smoker Former,   (-) COPD, asthma, lung cancer  Cardiovascular - negative cardio ROS and normal exam  Exercise tolerance: excellent (>7 METS)    ECG reviewed  Rhythm: regular  Rate: normal    (-) hypertension, valvular problems/murmurs, past MI, CAD, dysrhythmias, angina, CHF, cardiac stents, CABG, pericardial effusion      Neuro/Psych  (+) psychiatric history Anxiety,     (-) seizures, TIA, CVA  GI/Hepatic/Renal/Endo    (+)  hiatal hernia, GERD well controlled,    (-) PUD, hepatitis, liver disease, no renal disease, diabetes, GI bleed, no thyroid disorder    Musculoskeletal (-) negative ROS    Abdominal  - normal exam   Substance History - negative use     OB/GYN negative ob/gyn ROS         Other - negative ROS                       Anesthesia Plan    ASA 2     MAC     intravenous induction          [FreeTextEntry1] : \par 89 yo M with hx of HTN and ICMP anastasiya 25% (PCI of mLAD and mid RCA 10/2021), LBBB>150 msec, 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 fluid status much improved . high resolution ct chest looked beautiful and the abnormalities from prior even the fluid has resolved. 1/2 atorva. his main issue is leg weakness and vision changes.\par \par 4/2023 VISIT: during winter, did not push himself as much. had covid 10/2022 unfortunately. he has had worsening dyspnea and weight gain over past 2 months. jardiance approved instead of farxiga. echo today grossly unchanged. \par \par VISIT 9/2022: feels great. weight increase. labwork stable. feels well. happy. does not do cardiac rehab because "does not do anything for him." he is remaining active and going up/down well. 6 sessions left. 40 minutes [employer]. \par \par VISIT 6/2022: feels great, stronger, in cardiac rehab. wife and family very happy. no angina, improved tremendously. labs reviewed. \par \par VISIT 4/2022: Follow-up after hospitalization for acutely decompensated systolic heart failure.  I reviewed all records patient was diuresed aggressively with an MILLIE now improved to near baseline 1.5.  Was discharged about a week ago and underwent lab work post hospitalization yesterday.  The results are fairly promising as detailed below.  He feels much improved after discharge.  Compliant with medications.  Wife present.  Improved dyspnea.  Still with lower extremity edema.  Rash/cellulitis in pannus improved\par \par VISIT 3/2022: He reports being more fatigued and short of breath with mild exertion.  Difficulty sleeping.  Increasing lower extremity edema and weight gain over the past few months.  Interim lab work showed increasing BUN to creatinine ratio.  I added on lab work today to [facility] which I received and shows that his BUN is now 60 and creatinine 1.5.  He does urinate more with the Bumex but this has dropped off a bit.  Denies angina.  EKG today with unchanged left bundle branch block.\par He also was evaluated in the interim by EP and declines procedures at present.\par Has a severe intertriginous fungal rash.\par \par \par TESTING: excluding above\par \par 4/2023 TTE looks to have low flow low gradient AS. ef 25%. pasp 58. \par \par 11/2022 LABWORK: Cr 1.4. K 4.8. bun 42. . lft. \par \par 9/2022 LABWORK: BUN 38.  Creatinine 1.3.  Hemoglobin 12.6.  Potassium 4.7.  Lipid optimized\par \par 6/2022 LABWORK: hgb 12.7. cr 1.4. k 5.2. plt 142. \par TTE: EF 30%. pasp 60. severe LAE. mod AI/mod AS. mod MR. \par \par 4/2022 LABWORK: Creatinine 1.5.  BUN 41.  Normal LFT.  Hemoglobin 12.3.  Platelets improved to 242.\par \par 3/2022 LABWORK [facility]: BUN 60. Cr 1.5. LFT mild elevated. LDL 19. normal ferritin. Hgb 11.8. \par 1/2022 [facility] TTE: EF 35 to 40%.  PASP 68.  Severe left atrial enlargement.  Mild MR, mild mild AS, moderate AI.  Normal RV function.\par Labs 11/2021 Cr 1.2. \par Cath [facility] 10/2021: PCI mid RCA. edp 24\par Labs 10/2021: Hgb 10.5. Cr 1.38. \par EKG 9/15/2021 left bundle with PVCs.  .\par Cath [place] 9/9/2021: PCI mid LAD.  Residual mid RCA.  EDP 22.\par cath 8/23/21: mid lad 1,1,0 across D2 and mid RCA stenosis. mild elevated r/l pressures preserved index\par labwork: cr 1.2, hgb 12.7, LDL 29. \par Echo 8/2021: Ef 30% inferior rwma. mild LAE, mod AS, mod AR, mod MR, mod TR. PASP 55 prior to diuresis\par

## 2023-05-17 NOTE — DISCUSSION/SUMMARY
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 fluid status much improved . high resolution ct chest looked beautiful and the abnormalities from prior even the fluid has resolved. 1/2 atorva. his main issue is leg weakness and vision changes.\par \par # Decompensated systolic ICMP prior pCI last 10/2021: \par - restart cardiac rehab \par - Diuretic: switch bumex 1 bid to torsemide 20 bid due to vision changes. \par - GDMT: SGTL2i. BB/ARNI. \par - discussed again with patient/family regarding CRT-P or D and they are willing to discuss at close follow up visit\par - at f/u visit, I think LHC/RHC/cardiomems is a good idea to guide mgmt \par - might benefit from HF consultation\par - encouraged completing cardiac rehab \par - Continue aspirin monotherapy and atorva 80 lipids at goal\par \par # Moderate/severe mixed valve disease as detailed: stable for now. as above. could be LFLG AS as pullback gradients on caths are not significant. \par \par # CKD closely monitor: i think baseline will be 1.5.\par \par Follow within 1 month. labs.  ER precautions given to patient.\par

## 2023-06-14 NOTE — PHYSICAL EXAM
[Well Developed] : well developed [Well Nourished] : well nourished [No Acute Distress] : no acute distress [Obese] : obese [Normal Conjunctiva] : normal conjunctiva [Normal S1, S2] : normal S1, S2 [No Rub] : no rub [No Respiratory Distress] : no respiratory distress  [Soft] : abdomen soft [Non Tender] : non-tender [Normal Bowel Sounds] : normal bowel sounds [Moves all extremities] : moves all extremities [Alert and Oriented] : alert and oriented [Normal memory] : normal memory [de-identified] : JVPE 9 [de-identified] : no crackles [de-identified] : walker, arthritic gait [de-identified] : 1+ edema LE, brawny skin

## 2023-06-14 NOTE — HISTORY OF PRESENT ILLNESS
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 feels better. leg weakness and eye changes better. labwork done. declines cardiac rehab. on alphagan for glaucoma vision better. \par labwork potassium 4.7.  BUN 55.  Creatinine 1.63.  Normal LFT.  Hemoglobin 13.4.  Platelet 142 improved.\par \par 5/2023 VISIT: lost weight. fluid status much improved . high resolution ct chest looked beautiful and the abnormalities from prior even the fluid has resolved. 1/2 atorva. his main issue is leg weakness and vision changes.\par proBNP 590 BUN 65.  Creatinine 1.6.  Mild LFT.  K 5.1.  LDL 34\par \par 4/2023 VISIT: during winter, did not push himself as much. had covid 10/2022 unfortunately. he has had worsening dyspnea and weight gain over past 2 months. jardiance approved instead of farxiga. echo today grossly unchanged. \par \par VISIT 9/2022: feels great. weight increase. labwork stable. feels well. happy. does not do cardiac rehab because "does not do anything for him." he is remaining active and going up/down well. 6 sessions left. 40 minutes [employer]. \par \par VISIT 6/2022: feels great, stronger, in cardiac rehab. wife and family very happy. no angina, improved tremendously. labs reviewed. \par \par VISIT 4/2022: Follow-up after hospitalization for acutely decompensated systolic heart failure.  I reviewed all records patient was diuresed aggressively with an MILLIE now improved to near baseline 1.5.  Was discharged about a week ago and underwent lab work post hospitalization yesterday.  The results are fairly promising as detailed below.  He feels much improved after discharge.  Compliant with medications.  Wife present.  Improved dyspnea.  Still with lower extremity edema.  Rash/cellulitis in pannus improved\par \par VISIT 3/2022: He reports being more fatigued and short of breath with mild exertion.  Difficulty sleeping.  Increasing lower extremity edema and weight gain over the past few months.  Interim lab work showed increasing BUN to creatinine ratio.  I added on lab work today to [facility] which I received and shows that his BUN is now 60 and creatinine 1.5.  He does urinate more with the Bumex but this has dropped off a bit.  Denies angina.  EKG today with unchanged left bundle branch block.\par He also was evaluated in the interim by EP and declines procedures at present.\par Has a severe intertriginous fungal rash.\par \par \par TESTING: excluding above\par \par 4/2023 TTE looks to have low flow low gradient AS. ef 25%. pasp 58. \par \par 11/2022 LABWORK: Cr 1.4. K 4.8. bun 42. . lft. \par \par 9/2022 LABWORK: BUN 38.  Creatinine 1.3.  Hemoglobin 12.6.  Potassium 4.7.  Lipid optimized\par \par 6/2022 LABWORK: hgb 12.7. cr 1.4. k 5.2. plt 142. \par TTE: EF 30%. pasp 60. severe LAE. mod AI/mod AS. mod MR. \par \par 4/2022 LABWORK: Creatinine 1.5.  BUN 41.  Normal LFT.  Hemoglobin 12.3.  Platelets improved to 242.\par \par 3/2022 LABWORK [facility]: BUN 60. Cr 1.5. LFT mild elevated. LDL 19. normal ferritin. Hgb 11.8. \par 1/2022 [facility] TTE: EF 35 to 40%.  PASP 68.  Severe left atrial enlargement.  Mild MR, mild mild AS, moderate AI.  Normal RV function.\par Labs 11/2021 Cr 1.2. \par Cath [facility] 10/2021: PCI mid RCA. edp 24\par Labs 10/2021: Hgb 10.5. Cr 1.38. \par EKG 9/15/2021 left bundle with PVCs.  .\par Cath [facility] 9/9/2021: PCI mid LAD.  Residual mid RCA.  EDP 22.\par cath 8/23/21: mid lad 1,1,0 across D2 and mid RCA stenosis. mild elevated r/l pressures preserved index\par labwork: cr 1.2, hgb 12.7, LDL 29. \par Echo 8/2021: Ef 30% inferior rwma. mild LAE, mod AS, mod AR, mod MR, mod TR. PASP 55 prior to diuresis\par

## 2023-06-14 NOTE — DISCUSSION/SUMMARY
[FreeTextEntry1] : \par 89 yo M with hx of HTN and ICMP 30% (PCI of mLAD and staged mid RCA 10/2021), LBBB>150 msec, moderate valve disease AS/MR, HLD, severe pulm HTN, CKD III (1.4).\par \par lost weight. fluid status much improved . high resolution ct chest looked beautiful and the abnormalities from prior even the fluid has resolved. 1/2 atorva. his main issue is leg weakness and vision changes.\par \par # Decompensated systolic ICMP prior pCI last 10/2021: \par - declines cardiac rehab \par - Diuretic: On torsemide 20 bid  \par - GDMT: SGTL2i. BB/ARNI. \par - discussed again with patient/family regarding CRT-P or D and they are willing to discuss at close follow up visit\par - at f/u visit, I think LHC/RHC/cardiomems is a good idea to guide mgmt \par - might benefit from HF consultation\par - encouraged completing cardiac rehab \par - Continue aspirin monotherapy and atorva 40 lipids at goal\par \par # Moderate/severe mixed valve disease as detailed: stable for now. as above. could be LFLG AS as pullback gradients on caths are not significant. \par \par # CKD closely monitor: i think baseline will be 1.6.\par \par discuss brimodinie 0.1% been on 2 weeks so far. Follow within 3 months. labs.  ER precautions given to patient.\par

## 2023-09-13 PROBLEM — D69.6 THROMBOCYTOPENIA: Status: ACTIVE | Noted: 2022-04-07

## 2023-12-13 NOTE — PHYSICAL EXAM
[Frail] : frail [No Respiratory Distress] : no respiratory distress  [Normal] : soft, non-tender, no masses/organomegaly, normal bowel sounds [No Rash] : no rash [Moves all extremities] : moves all extremities [Alert and Oriented] : alert and oriented [de-identified] : JVP elevated to 12cm with + HJR. [de-identified] : no crackles [de-identified] : walks with cane [de-identified] : 2+ pitting edema to knees L > R, warm

## 2023-12-13 NOTE — DISCUSSION/SUMMARY
[FreeTextEntry1] : # HFrEF  - ETIOLOGY: 2/2 ICM and possible LFLG aortic stenosis.  - GDMT:  - On carvedilol 3.125 mg twice daily, Entresto 24-26 mg twice daily (patient's wife stopped), Jardiance 10 mg daily. -Not on MRA due to elevated K in the past up to 5.2. -Stop carvedilol.  Start on metoprolol 25 mg XL daily in AM.  Resume Entresto. -Patient's wife was educated not to stop medications without notifying our office.  - DIURETICS: Hypervolemic on exam today.  Pro BNP increased from 1085 to 1325 on 12/6/23.  No weight change since last visit.  This may be secondary to stopping Entresto. -Increase torsemide to 80 mg twice daily. -Will need to get repeat lab work after next visit. -He would be a good candidate for fluroscix SQ infusion.  They do not want to go to the hospital for IV diuretics.  - DEVICE: none in place.    -Goals of care briefly discussed with patient and his family.  At this time, family would like to avoid any additional procedures.  Discussed with family about working up his current symptoms with [facility] +/-CardioMEMS and about possible CRT-D/P implant.  At this time, they would like to defer this.    F/U in 1 week for volume check.

## 2023-12-13 NOTE — HISTORY OF PRESENT ILLNESS
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  Patient was last seen in clinic on 12/7/2023.  At that time he was volume overloaded and his diuretics were uptitrated.  Patient's wife initially told me he was on Bumex however she called back and mention that he was on torsemide.  His dose at home was doubled to 40mg BID.  Today, patient is here with his wife.  They note that his weight has unchanged from last visit.  Home weight is stable at 250 pounds.  Patient's wife notes that she stopped giving him Entresto because his blood pressure was low in the mornings.  His BP was 90/50 and he felt very fatigued and was unable to walk.   He notes that his dyspnea is improving however not much change with regards to leg swelling.  He denies chest pain, palpitations, bendopnea, lightheadedness, dizziness, and syncope/pre-syncope.

## 2023-12-13 NOTE — ASSESSMENT
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  He has ACC/AHA stage C cardiomyopathy with NYHA class II-III symptoms.

## 2023-12-13 NOTE — REASON FOR VISIT
[Symptom and Test Evaluation] : symptom and test evaluation [Cardiac Failure] : cardiac failure [Spouse] : spouse [Family Member] : family member

## 2023-12-28 PROBLEM — E66.9 OBESITY (BMI 30-39.9): Status: ACTIVE | Noted: 2021-08-27

## 2023-12-28 NOTE — CARDIOLOGY SUMMARY
[de-identified] : 4/12/2023: LVEF 25 to 30%, akinetic inferolateral and inferolateral wall, LVEDD 4.1 cm, IVSd 1.1 cm, moderate MR, moderate TR, severe AS concerning for low-flow low gradient - PV 2.5m/s, MG 16, YASIR 0.8cm2, mod AI.

## 2023-12-28 NOTE — HISTORY OF PRESENT ILLNESS
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  Patient was last seen in clinic on 12/13/2023.  At that time he was volume overloaded and his diuretics were uptitrated to torsemide 80 mg twice daily.  Wife reports some hypotension at home and she had discontinued his Entresto.  I had reinstated Entresto and change carvedilol to metoprolol at that visit.  Today, patient is here with his wife.  They note that his weight has dropped by 6 pounds since his last visit.  He reports feeling much better with his breathing and feels that he has less abdominal bloating and can move around easier at the house.  Home BP continues to range from /60-70. He denies chest pain, palpitations, bendopnea, lightheadedness, dizziness, and syncope/pre-syncope.

## 2023-12-28 NOTE — PHYSICAL EXAM
[Frail] : frail [No Respiratory Distress] : no respiratory distress  [Normal] : soft, non-tender, no masses/organomegaly, normal bowel sounds [No Rash] : no rash [Moves all extremities] : moves all extremities [Alert and Oriented] : alert and oriented [de-identified] : JVP elevated to 10cm with + HJR. [de-identified] : no crackles [de-identified] : walks with cane [de-identified] : 2+ pitting edema to knees L > R, warm

## 2023-12-28 NOTE — DISCUSSION/SUMMARY
[FreeTextEntry1] : # HFrEF  - ETIOLOGY: 2/2 ICM and possible LFLG aortic stenosis. -Would like to repeat echocardiogram once euvolemic to assess AS severity.  - GDMT:  - On metoprolol 25 mg XL daily, Entresto 24-26 mg twice daily,  and Jardiance 10 mg daily. -Not on MRA due to elevated K in the past up to 5.2.  - DIURETICS: Hypervolemic on exam today.  Pro BNP decreasing from 1325 to now 1263.  6 pound weight loss since last visit.   -Increase torsemide to 100 mg twice daily. - ill give initial fluroscix SQ infusion today in office.  They do not want to go to the hospital for IV diuretics. -Will need to get repeat lab work after next visit. -Last labs 12/26/2023: Creatinine down from 1.87-1.79, potassium 5, MG 2.5.  - DEVICE: none in place.    -Goals of care briefly discussed with patient and his family.  At this time, family would like to avoid any additional procedures.  Discussed with family about working up his current symptoms with [facility] +/-CardioMEMS and about possible CRT-D/P implant.  At this time, they would like to defer this.    F/U with nursing phone call tomorrow, in office volume check in 2 weeks.

## 2023-12-28 NOTE — ASSESSMENT
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  He has ACC/AHA stage C cardiomyopathy with NYHA class II symptoms, improving with increasing diuresis.

## 2024-01-01 ENCOUNTER — APPOINTMENT (OUTPATIENT)
Dept: CARDIOLOGY | Facility: CLINIC | Age: 89
End: 2024-01-01
Payer: MEDICARE

## 2024-01-01 ENCOUNTER — TRANSCRIPTION ENCOUNTER (OUTPATIENT)
Age: 89
End: 2024-01-01

## 2024-01-01 ENCOUNTER — APPOINTMENT (OUTPATIENT)
Dept: CARDIOLOGY | Facility: CLINIC | Age: 89
End: 2024-01-01

## 2024-01-01 ENCOUNTER — NON-APPOINTMENT (OUTPATIENT)
Age: 89
End: 2024-01-01

## 2024-01-01 VITALS
HEIGHT: 68 IN | DIASTOLIC BLOOD PRESSURE: 56 MMHG | OXYGEN SATURATION: 94 % | HEART RATE: 91 BPM | WEIGHT: 240 LBS | BODY MASS INDEX: 36.37 KG/M2 | SYSTOLIC BLOOD PRESSURE: 94 MMHG

## 2024-01-01 DIAGNOSIS — I10 ESSENTIAL (PRIMARY) HYPERTENSION: ICD-10-CM

## 2024-01-01 DIAGNOSIS — I25.5 ISCHEMIC CARDIOMYOPATHY: ICD-10-CM

## 2024-01-01 DIAGNOSIS — I35.0 NONRHEUMATIC AORTIC (VALVE) STENOSIS: ICD-10-CM

## 2024-01-01 DIAGNOSIS — I25.10 ATHEROSCLEROTIC HEART DISEASE OF NATIVE CORONARY ARTERY W/OUT ANGINA PECTORIS: ICD-10-CM

## 2024-01-01 DIAGNOSIS — I50.20 UNSPECIFIED SYSTOLIC (CONGESTIVE) HEART FAILURE: ICD-10-CM

## 2024-01-01 DIAGNOSIS — I44.7 LEFT BUNDLE-BRANCH BLOCK, UNSPECIFIED: ICD-10-CM

## 2024-01-01 PROCEDURE — 99215 OFFICE O/P EST HI 40 MIN: CPT

## 2024-01-18 PROBLEM — I25.10 ARTERIOSCLEROSIS OF CORONARY ARTERY: Status: ACTIVE | Noted: 2021-08-27

## 2024-01-18 PROBLEM — I25.5 ISCHEMIC CARDIOMYOPATHY: Status: ACTIVE | Noted: 2021-08-27

## 2024-01-18 PROBLEM — I44.7 LBBB (LEFT BUNDLE BRANCH BLOCK): Status: ACTIVE | Noted: 2021-08-27

## 2024-01-18 PROBLEM — I35.0 AORTIC STENOSIS: Status: ACTIVE | Noted: 2023-01-01

## 2024-01-18 PROBLEM — I50.20 SYSTOLIC CHF WITH REDUCED LEFT VENTRICULAR FUNCTION, NYHA CLASS 3: Status: ACTIVE | Noted: 2021-08-27

## 2024-01-18 NOTE — HISTORY OF PRESENT ILLNESS
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  12/13/2023 - volume overloaded, diuretics were uptitrated to torsemide 80 mg twice daily.  Wife reported some hypotension at home and she had discontinued his Entresto.  I had reinstated Entresto and change carvedilol to metoprolol at that visit.  12/28/23 - Wt decreased by 6lbs but still volume overloaded.  Torsemide increaed to 100mg BID and given Furoscix infusion in office.  Today, patient is here with his wife.  They note that his weight has dropped by 6 pounds since his last visit.  He reports feeling much better with his breathing and feels that he has less abdominal bloating and can move around easier at the house.  Home BP continues to range from /60-70.  He denies chest pain, palpitations, bendopnea, lightheadedness, dizziness, and syncope/pre-syncope.

## 2024-01-18 NOTE — ASSESSMENT
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  He has ACC/AHA stage C cardiomyopathy with NYHA class II symptoms, improving with increasing diuresis.

## 2024-01-18 NOTE — PHYSICAL EXAM
[Frail] : frail [No Respiratory Distress] : no respiratory distress  [Normal] : soft, non-tender, no masses/organomegaly, normal bowel sounds [No Rash] : no rash [Moves all extremities] : moves all extremities [Alert and Oriented] : alert and oriented [de-identified] : JVP elevated to 10cm with + HJR. [de-identified] : no crackles [de-identified] : 2+ pitting edema to knees L > R, warm [de-identified] : walks with cane

## 2024-01-18 NOTE — CARDIOLOGY SUMMARY
[de-identified] : 4/12/2023: LVEF 25 to 30%, akinetic inferolateral and inferolateral wall, LVEDD 4.1 cm, IVSd 1.1 cm, moderate MR, moderate TR, severe AS concerning for low-flow low gradient - PV 2.5m/s, MG 16, YASIR 0.8cm2, mod AI.

## 2024-01-18 NOTE — PHYSICAL EXAM
[Frail] : frail [No Respiratory Distress] : no respiratory distress  [Normal] : soft, non-tender, no masses/organomegaly, normal bowel sounds [No Rash] : no rash [Moves all extremities] : moves all extremities [Alert and Oriented] : alert and oriented [de-identified] : JVP elevated to 10cm with + HJR. [de-identified] : no crackles [de-identified] : walks with cane [de-identified] : 2+ pitting edema to knees L > R, warm,

## 2024-01-18 NOTE — DISCUSSION/SUMMARY
[FreeTextEntry1] : # HFrEF  - ETIOLOGY: 2/2 ICM and possible LFLG aortic stenosis. -Would like to repeat echocardiogram once euvolemic to assess AS severity.  - GDMT: - On metoprolol 25 mg XL daily, Entresto 24-26 mg twice daily, and Jardiance 10 mg daily. -Not on MRA due to elevated K in the past up to 5.3.  - DIURETICS: Hypervolemic on exam today. Pro BNP decreasing from 1325 to now 1027 with 10 lbs wt decrease. - continue on torsemide to 100 mg twice daily. - Recieved in office Furoscix on 12/28/23.  They do not want to go to the hospital for IV diuretics. - Fill place Furoscix again today.  Patient has 4 more free 6 doses at home.  I have asked him to bring 1 of these to his next visit and he may need to get this applied again. - Labs 12/26/2023: Cr 1.79 (from 1.87), K 5, Mg 2.5, NT pro BNP 1263 - Labs 1/4/24: K 5.3, Cr 2.05, NT proBNP 1027 - home lab draw ordered for Monday 1/22/21.   - DEVICE: none in place.  -Goals of care briefly discussed with patient and his family. At this time, family would like to avoid any additional procedures. Discussed with family about working up his current symptoms with [facility] +/-CardioMEMS and about possible CRT-D/P implant. At this time, they would like to defer this.  He will see Dr. Cantu in 2 weeks and then see me mid-February.

## 2024-01-18 NOTE — CARDIOLOGY SUMMARY
[de-identified] : 4/12/2023: LVEF 25 to 30%, akinetic inferolateral and inferolateral wall, LVEDD 4.1 cm, IVSd 1.1 cm, moderate MR, moderate TR, severe AS concerning for low-flow low gradient - PV 2.5m/s, MG 16, YASIR 0.8cm2, mod AI.

## 2024-01-18 NOTE — HISTORY OF PRESENT ILLNESS
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  12/13/2023 - volume overloaded, diuretics were uptitrated to torsemide 80 mg twice daily. Wife reported some hypotension at home and she had discontinued his Entresto. I had reinstated Entresto and change carvedilol to metoprolol at that visit.  12/28/23 - Wt decreased by 6lbs but still volume overloaded. Torsemide increaed to 100mg BID and given Furoscix infusion in office.  Today, patient is here with his wife and daughter.  They note a 10lbs weight drop.  He is able to move around better at home however still has orthopnea at night.  Lower extremity edema improving.  Home BP continues to range from /60-70.  He denies chest pain, palpitations, bendopnea, lightheadedness, dizziness, and syncope/pre-syncope.

## 2024-01-18 NOTE — DISCUSSION/SUMMARY
[FreeTextEntry1] : # HFrEF  - ETIOLOGY: 2/2 ICM and possible LFLG aortic stenosis. -Would like to repeat echocardiogram once euvolemic to assess AS severity.  - GDMT:  - On metoprolol 25 mg XL daily, Entresto 24-26 mg twice daily,  and Jardiance 10 mg daily. -Not on MRA due to elevated K in the past up to 5.2.  - DIURETICS: Hypervolemic on exam today.  Pro BNP decreasing from 1325 to now 1263.  6 pound weight loss since last visit.   -Increase torsemide to 100 mg twice daily. - ill give initial fluroscix SQ infusion today in office.  They do not want to go to the hospital for IV diuretics. -Will need to get repeat lab work after next visit. - Labs 12/26/2023: Cr 1.79 (from 1.87), K 5, Mg 2.5, NT pro BNP 1263 - LAbs 1/4/24: K 5.3, Cr 2.05, NT proBNP 1027  - DEVICE: none in place.    -Goals of care briefly discussed with patient and his family.  At this time, family would like to avoid any additional procedures.  Discussed with family about working up his current symptoms with [facility] +/-CardioMEMS and about possible CRT-D/P implant.  At this time, they would like to defer this.    F/U with nursing phone call tomorrow, in office volume check in 2 weeks.

## 2025-05-28 NOTE — END OF VISIT
[Time Spent: ___ minutes] : I have spent [unfilled] minutes of time on the encounter. Breath Sounds equal & clear to percussion & auscultation, no accessory muscle use